# Patient Record
Sex: FEMALE | Race: WHITE | Employment: OTHER | URBAN - METROPOLITAN AREA
[De-identification: names, ages, dates, MRNs, and addresses within clinical notes are randomized per-mention and may not be internally consistent; named-entity substitution may affect disease eponyms.]

---

## 2018-07-19 ENCOUNTER — TELEPHONE (OUTPATIENT)
Dept: NEUROLOGY | Age: 83
End: 2018-07-19

## 2018-07-19 ENCOUNTER — HOSPITAL ENCOUNTER (OUTPATIENT)
Age: 83
Discharge: HOME | End: 2018-07-19
Payer: MEDICARE

## 2018-07-19 ENCOUNTER — INITIAL CONSULT (OUTPATIENT)
Dept: NEUROLOGY | Age: 83
End: 2018-07-19

## 2018-07-19 VITALS
HEART RATE: 60 BPM | OXYGEN SATURATION: 97 % | SYSTOLIC BLOOD PRESSURE: 127 MMHG | WEIGHT: 114 LBS | DIASTOLIC BLOOD PRESSURE: 60 MMHG | BODY MASS INDEX: 20.2 KG/M2 | HEIGHT: 63 IN

## 2018-07-19 DIAGNOSIS — I10 HTN (HYPERTENSION), BENIGN: ICD-10-CM

## 2018-07-19 DIAGNOSIS — G50.0 TRIGEMINAL NEURALGIA: Primary | ICD-10-CM

## 2018-07-19 LAB
A/G RATIO: 1.6 (ref 1.1–2.2)
ALBUMIN SERPL-MCNC: 4.7 G/DL (ref 3.4–5)
ALP BLD-CCNC: 98 U/L (ref 40–129)
ALT SERPL-CCNC: 17 U/L (ref 10–40)
ANION GAP SERPL CALCULATED.3IONS-SCNC: 13 MMOL/L (ref 3–16)
AST SERPL-CCNC: 24 U/L (ref 15–37)
BASOPHILS ABSOLUTE: 0.1 K/UL (ref 0–0.2)
BASOPHILS RELATIVE PERCENT: 0.9 %
BILIRUB SERPL-MCNC: 0.6 MG/DL (ref 0–1)
BILIRUBIN DIRECT: <0.2 MG/DL (ref 0–0.3)
BILIRUBIN, INDIRECT: NORMAL MG/DL (ref 0–1)
BUN BLDV-MCNC: 25 MG/DL (ref 7–20)
CALCIUM SERPL-MCNC: 9.7 MG/DL (ref 8.3–10.6)
CHLORIDE BLD-SCNC: 98 MMOL/L (ref 99–110)
CO2: 25 MMOL/L (ref 21–32)
CREAT SERPL-MCNC: 1.1 MG/DL (ref 0.6–1.2)
EOSINOPHILS ABSOLUTE: 0.2 K/UL (ref 0–0.6)
EOSINOPHILS RELATIVE PERCENT: 2.9 %
FOLATE: 16.65 NG/ML (ref 4.78–24.2)
GFR AFRICAN AMERICAN: 58
GFR NON-AFRICAN AMERICAN: 48
GLOBULIN: 3 G/DL
GLUCOSE BLD-MCNC: 99 MG/DL (ref 70–99)
HCT VFR BLD CALC: 38.5 % (ref 36–48)
HEMOGLOBIN: 13.3 G/DL (ref 12–16)
LYMPHOCYTES ABSOLUTE: 2 K/UL (ref 1–5.1)
LYMPHOCYTES RELATIVE PERCENT: 32 %
MCH RBC QN AUTO: 31 PG (ref 26–34)
MCHC RBC AUTO-ENTMCNC: 34.7 G/DL (ref 31–36)
MCV RBC AUTO: 89.5 FL (ref 80–100)
MONOCYTES ABSOLUTE: 0.7 K/UL (ref 0–1.3)
MONOCYTES RELATIVE PERCENT: 10.7 %
NEUTROPHILS ABSOLUTE: 3.4 K/UL (ref 1.7–7.7)
NEUTROPHILS RELATIVE PERCENT: 53.5 %
PDW BLD-RTO: 12.7 % (ref 12.4–15.4)
PLATELET # BLD: 268 K/UL (ref 135–450)
PMV BLD AUTO: 7.3 FL (ref 5–10.5)
POTASSIUM SERPL-SCNC: 4.6 MMOL/L (ref 3.5–5.1)
RBC # BLD: 4.3 M/UL (ref 4–5.2)
SODIUM BLD-SCNC: 136 MMOL/L (ref 136–145)
TOTAL PROTEIN: 7.7 G/DL (ref 6.4–8.2)
VITAMIN B-12: 513 PG/ML (ref 211–911)
WBC # BLD: 6.3 K/UL (ref 4–11)

## 2018-07-19 PROCEDURE — G8400 PT W/DXA NO RESULTS DOC: HCPCS | Performed by: PSYCHIATRY & NEUROLOGY

## 2018-07-19 PROCEDURE — 1036F TOBACCO NON-USER: CPT | Performed by: PSYCHIATRY & NEUROLOGY

## 2018-07-19 PROCEDURE — 82746 ASSAY OF FOLIC ACID SERUM: CPT

## 2018-07-19 PROCEDURE — 82248 BILIRUBIN DIRECT: CPT

## 2018-07-19 PROCEDURE — 4040F PNEUMOC VAC/ADMIN/RCVD: CPT | Performed by: PSYCHIATRY & NEUROLOGY

## 2018-07-19 PROCEDURE — G8420 CALC BMI NORM PARAMETERS: HCPCS | Performed by: PSYCHIATRY & NEUROLOGY

## 2018-07-19 PROCEDURE — 85025 COMPLETE CBC W/AUTO DIFF WBC: CPT

## 2018-07-19 PROCEDURE — 36415 COLL VENOUS BLD VENIPUNCTURE: CPT

## 2018-07-19 PROCEDURE — 1101F PT FALLS ASSESS-DOCD LE1/YR: CPT | Performed by: PSYCHIATRY & NEUROLOGY

## 2018-07-19 PROCEDURE — 99204 OFFICE O/P NEW MOD 45 MIN: CPT | Performed by: PSYCHIATRY & NEUROLOGY

## 2018-07-19 PROCEDURE — 1090F PRES/ABSN URINE INCON ASSESS: CPT | Performed by: PSYCHIATRY & NEUROLOGY

## 2018-07-19 PROCEDURE — 80053 COMPREHEN METABOLIC PANEL: CPT

## 2018-07-19 PROCEDURE — 82607 VITAMIN B-12: CPT

## 2018-07-19 PROCEDURE — G8427 DOCREV CUR MEDS BY ELIG CLIN: HCPCS | Performed by: PSYCHIATRY & NEUROLOGY

## 2018-07-19 PROCEDURE — 1123F ACP DISCUSS/DSCN MKR DOCD: CPT | Performed by: PSYCHIATRY & NEUROLOGY

## 2018-07-19 RX ORDER — ACETAMINOPHEN 500 MG
500 TABLET ORAL EVERY 6 HOURS PRN
COMMUNITY

## 2018-07-19 RX ORDER — CARBAMAZEPINE 100 MG/1
100 TABLET, EXTENDED RELEASE ORAL 2 TIMES DAILY
Qty: 60 TABLET | Refills: 2 | Status: SHIPPED | OUTPATIENT
Start: 2018-07-19 | End: 2018-08-07 | Stop reason: SINTOL

## 2018-07-19 RX ORDER — TRAMADOL HYDROCHLORIDE 50 MG/1
TABLET ORAL
COMMUNITY
Start: 2018-07-11 | End: 2018-08-13

## 2018-07-19 NOTE — PROGRESS NOTES
The patient is a [de-identified]y.o. years old female who  was referred by LV Benson CNP  for consultation regarding new onset right facial pain. HPI:  The patient describes history of intractable right facial pain that started 2 years ago. She was diagnosed in the past with trigeminal neuralgia and she was placed on gabapentin. She was doing well until a week ago. She started having some severe pain which was daily. She has been seen in the ED recently for such pain. Pain is in the right side of face. Pain can be severe and daily. Pain is electric shooting and throbbing. Duration is seconds to minutes. Pressure and hot packs make it better. Not worse with eating or touch but she can not eat when she is having such pain. No other associated symptoms. No headaches, visual changes, weakness, numbness or recent fever. She tried Ultram with some relief. She is taking Gabapentin 300 mg tid for the last 1-2  Years. She takes Baclofen 10 mg at night as needed. She hasn't had any recent MRI of the brain. She denies any recent fever or chill, or trauma. History of HTN on medication. She denies any CP or headaches. She denies any sleep issues, falling, recent fever and other ROS was negative. Past Medical History:   Diagnosis Date    Cancer [facility])     breast    Hyperlipidemia     Hypertension     Trigeminal neuralgia     Trigeminal neuralgia 7/19/2018     Prior to Visit Medications    Medication Sig Taking? Authorizing Provider   traMADol (ULTRAM) 50 MG tablet  Yes Historical Provider, MD   acetaminophen (TYLENOL) 500 MG tablet Take 500 mg by mouth every 6 hours as needed for Pain Yes Historical Provider, MD   carBAMazepine (TEGRETOL-XR) 100 MG extended release tablet Take 1 tablet by mouth 2 times daily Yes Davida Esparza MD   gabapentin (NEURONTIN) 300 MG capsule Take 300 mg by mouth 3 times daily. . Yes Historical Provider, MD   baclofen (LIORESAL) 10 MG tablet Take 10 mg by mouth every

## 2018-07-19 NOTE — LETTER
Kati Newsome MD    [facility]  [address] [facility], [address] [place] [address]. [address] [place] [address]    [phone] (Phone)  [phone] (Fax)    Dear LV Rodriguez CNP    I had the pleasure of seeing your patient Keisha Patel  1935 today. I have attached a detailed summary below:    The patient is a [de-identified]y.o. years old female who  was referred by LV Mcdonald CNP  for consultation regarding new onset right facial pain. HPI:  The patient describes history of intractable right facial pain that started 2 years ago. She was diagnosed in the past with trigeminal neuralgia and she was placed on gabapentin. She was doing well until a week ago. She started having some severe pain which was daily. She has been seen in the ED recently for such pain. Pain is in the right side of face. Pain can be severe and daily. Pain is electric shooting and throbbing. Duration is seconds to minutes. Pressure and hot packs make it better. Not worse with eating or touch but she can not eat when she is having such pain. No other associated symptoms. No headaches, visual changes, weakness, numbness or recent fever. She tried Ultram with some relief. She is taking Gabapentin 300 mg tid for the last 1-2  Years. She takes Baclofen 10 mg at night as needed. She hasn't had any recent MRI of the brain. She denies any recent fever or chill, or trauma. History of HTN on medication. She denies any CP or headaches. She denies any sleep issues, falling, recent fever and other ROS was negative. Past Medical History:   Diagnosis Date    Cancer [facility])     breast    Hyperlipidemia     Hypertension     Trigeminal neuralgia     Trigeminal neuralgia 2018     Prior to Visit Medications    Medication Sig Taking?  Authorizing Provider   traMADol (ULTRAM) 50 MG tablet  Yes Historical Provider, MD acetaminophen (TYLENOL) 500 MG tablet Take 500 mg by mouth every 6 hours as needed for Pain Yes Historical Provider, MD   carBAMazepine (TEGRETOL-XR) 100 MG extended release tablet Take 1 tablet by mouth 2 times daily Yes Jennifer Scott MD   gabapentin (NEURONTIN) 300 MG capsule Take 300 mg by mouth 3 times daily. . Yes Historical Provider, MD   baclofen (LIORESAL) 10 MG tablet Take 10 mg by mouth every evening Yes Historical Provider, MD   vitamin D (ERGOCALCIFEROL) 69206 UNITS CAPS capsule Take 1 capsule by mouth twice a week. Yes Corrinne Finders, MD   atenolol (TENORMIN) 50 MG tablet Take 50 mg by mouth daily. Yes Historical Provider, MD   lisinopril-hydrochlorothiazide (PRINZIDE;ZESTORETIC) 20-12.5 MG per tablet Take 1 tablet by mouth daily. Yes Historical Provider, MD     Allergies   Allergen Reactions    Erythromycin      Social History   Substance Use Topics    Smoking status: Never Smoker    Smokeless tobacco: Never Used    Alcohol use No     History reviewed. No pertinent family history. History reviewed. No pertinent surgical history. ROS : A 10-12 system review of constitutional, cardiovascular, respiratory, musculoskeletal, endocrine, skin, SHEENT, genitourinary, psychiatric and neurologic systems was obtained and updated today and is unremarkable except as mentioned in my HPI        Exam:   Constitutional:   Vitals:    07/19/18 0957   BP: 127/60   Pulse: 60   SpO2: 97%   Weight: 114 lb (51.7 kg)   Height: 5' 3\" (1.6 m)       General appearance: well-nourished. Eye: No icterus. No blurring of optic disc. Neck: supple  Cardiovascular: No carotid bruit. No lower leg edema with good pulsation. Mental Status: Oriented to person, place, problem, and time. Fluent speech. Good fund of knowledge. Normal attention span and concentration.    Cranial Nerves:   II: Visual fields: Full to confrontation  III: Pupils: equal, round, reactive to light III,IV,VI: Extra Ocular Movements are intact. No nystagmus  V: Facial sensation is intact to pin prick and light touch  VII: Facial strength and movements: intact and symmetric smile,cheek puffing and eyebrow elevation  VIII: Hearing: Intact to finger rub bilaterally  IX: Palate elevation is symmetric  XI: Shoulder shrug is intact  XII: Tongue movements are normal  Musculoskeletal: 5/5 in all 4 extremities. Normal tone. Reflexes: Bilateral biceps 2/4, triceps 2/4, brachial radialis 2/4, knee 2/4 and ankle 2/4. Planters: flexor bilaterally. Coordination: no pronator drift, no dysmetria. Finger nose finger testing within normal limits. Sensation: normal to all modalities. Gait/Posture: steady      Medical decision making:  I personally reviewed and updated social history, past medical history, medications, allergy, surgical history, and family history as documented in the patient's electronic health records. Labs and/or neuroimaging and other test results reviewed and discussed with the patient. Reviewed notes from other physicians. Provided patient education regarding risk, benefits and treatment options as well as adherence to medication regimen and side effect from these medications. Diagnosis Orders   1. Trigeminal neuralgia  MRI Brain WO Contrast    carBAMazepine (TEGRETOL-XR) 100 MG extended release tablet    CBC with Differential    Comprehensive Metabolic Panel    Hepatic Function Panel    Vitamin B12 & Folate   2. HTN (hypertension), benign         Assessment:  Intractable right facial pain consistent with intractable trigeminal neuralgia. Worse recently.    Hypertension      Plan:  Start Tegretol 100 mg XR daily for 5 days then twice daily  Long discussion with the patient regarding side effects from Tegretol and possible skin rash  Check CBC, CMP and LFT  Check B12 and folate  Recheck A1c with PCP  Continue gabapentin 300 mg 3 times daily Advised the patient to avoid using Ultram for breakthrough. We discussed outcome from such a disease, etiology and possible medical and surgical management. Schedule MRI of the brain  Falling precautions  Continue home blood pressure medications  Monitor blood pressure at home  Use baclofen only as needed  Follow-up next month for further recommendation. Please do not hesitate to contact me, should you have any questions or concerns regarding the care of Ros     Sincerely,    Jennifer Lund MD    This dictation was generated by voice recognition computer software. Although all attempts are made to edit the dictation for accuracy, there may be errors in the transcription that are not intended.

## 2018-07-25 ENCOUNTER — TELEPHONE (OUTPATIENT)
Dept: NEUROLOGY | Age: 83
End: 2018-07-25

## 2018-07-27 NOTE — TELEPHONE ENCOUNTER
Patient calls stating she has been taking the Tegretol 100 mg at night (x 2 days), instead of in the morning because it was causing dizziness and nausea, but now she is having terrible crying spells and she was unable to fall asleep last night until after 2 am and woke up at 7 am.    I advised patient to DC the Tegretol and I would leave a message for TS. Patient verbalized understanding. Please advise. Last seen 07.19.18    Assessment:  Intractable right facial pain consistent with intractable trigeminal neuralgia. Worse recently. Hypertension      Plan:  Start Tegretol 100 mg XR daily for 5 days then twice daily  Long discussion with the patient regarding side effects from Tegretol and possible skin rash  Check CBC, CMP and LFT  Check B12 and folate  Recheck A1c with PCP  Continue gabapentin 300 mg 3 times daily  Advised the patient to avoid using Ultram for breakthrough. We discussed outcome from such a disease, etiology and possible medical and surgical management.   Schedule MRI of the brain  Falling precautions  Continue home blood pressure medications  Monitor blood pressure at home  Use baclofen only as needed  Follow-up next month for further recommendation.

## 2018-08-01 ENCOUNTER — HOSPITAL ENCOUNTER (OUTPATIENT)
Dept: MRI IMAGING | Age: 83
Discharge: HOME | End: 2018-08-01
Payer: MEDICARE

## 2018-08-01 DIAGNOSIS — G50.0 TRIGEMINAL NEURALGIA: ICD-10-CM

## 2018-08-01 PROCEDURE — 70551 MRI BRAIN STEM W/O DYE: CPT

## 2018-08-07 NOTE — TELEPHONE ENCOUNTER
Spoke with patient, she is completely off the Tegretol and all side effects have completely resolved. She is back to her baseline. She does not want to try any different medication at this time. She will continue with her  mg TID, and discuss with TS at f/u on Monday.

## 2018-08-13 ENCOUNTER — OFFICE VISIT (OUTPATIENT)
Dept: NEUROLOGY | Age: 83
End: 2018-08-13

## 2018-08-13 VITALS
HEART RATE: 58 BPM | SYSTOLIC BLOOD PRESSURE: 124 MMHG | OXYGEN SATURATION: 97 % | HEIGHT: 63 IN | WEIGHT: 114 LBS | BODY MASS INDEX: 20.2 KG/M2 | DIASTOLIC BLOOD PRESSURE: 58 MMHG

## 2018-08-13 DIAGNOSIS — G50.0 TRIGEMINAL NEURALGIA: Primary | ICD-10-CM

## 2018-08-13 DIAGNOSIS — I10 HTN (HYPERTENSION), BENIGN: ICD-10-CM

## 2018-08-13 DIAGNOSIS — R22.1 NECK MASS: ICD-10-CM

## 2018-08-13 PROCEDURE — 1090F PRES/ABSN URINE INCON ASSESS: CPT | Performed by: PSYCHIATRY & NEUROLOGY

## 2018-08-13 PROCEDURE — 99213 OFFICE O/P EST LOW 20 MIN: CPT | Performed by: PSYCHIATRY & NEUROLOGY

## 2018-08-13 PROCEDURE — 1101F PT FALLS ASSESS-DOCD LE1/YR: CPT | Performed by: PSYCHIATRY & NEUROLOGY

## 2018-08-13 PROCEDURE — G8427 DOCREV CUR MEDS BY ELIG CLIN: HCPCS | Performed by: PSYCHIATRY & NEUROLOGY

## 2018-08-13 PROCEDURE — G8420 CALC BMI NORM PARAMETERS: HCPCS | Performed by: PSYCHIATRY & NEUROLOGY

## 2018-08-13 PROCEDURE — 1123F ACP DISCUSS/DSCN MKR DOCD: CPT | Performed by: PSYCHIATRY & NEUROLOGY

## 2018-08-13 PROCEDURE — 4040F PNEUMOC VAC/ADMIN/RCVD: CPT | Performed by: PSYCHIATRY & NEUROLOGY

## 2018-08-13 PROCEDURE — G8400 PT W/DXA NO RESULTS DOC: HCPCS | Performed by: PSYCHIATRY & NEUROLOGY

## 2018-08-13 PROCEDURE — 1036F TOBACCO NON-USER: CPT | Performed by: PSYCHIATRY & NEUROLOGY

## 2018-08-13 RX ORDER — BACLOFEN 10 MG/1
10 TABLET ORAL 2 TIMES DAILY PRN
Qty: 20 TABLET | Refills: 0 | Status: SHIPPED | OUTPATIENT
Start: 2018-08-13 | End: 2019-02-12

## 2018-08-13 NOTE — LETTER
Kassy Alva MD    [facility]  [address] [facility], [address] [place] [address]. [address] [place] [address]    [phone] (Phone)  [phone] (Fax)    Dear Dr Alana Bush, APRN - CNP    I had the pleasure of seeing your patient Lacy Ellis  1935 today. I have attached a detailed summary below:    The patient came today for follow up regarding: facial pain    Since the patient's last visit, she had her MRI of the brain which showed no intracranial lesions but showed incidental left nasopharyngeal lesion. She had a blood test which was unremarkable. The patient was started on low-dose Tegretol however she developed side effects with sedation, lightheadedness and significant insomnia. She decided to discontinue such medication. She is now on gabapentin 300 mg 3 times daily. She denies any side effect of such medication. She continues to have spells of right facial pain. She described less severe attacks since her last visit. Spells could last for few seconds to minutes. Degree is moderate to severe and description is electric shooting pain. No other associated symptoms. No headache, double vision or blurred vision or numbness or tingling. No neck or back pain. Blood pressure is controlled on medications. Other review of system was remarkable for increased coughing over the last few weeks. Past Medical History:   Diagnosis Date    Cancer [facility])     breast    Hyperlipidemia     Hypertension     Trigeminal neuralgia     Trigeminal neuralgia 2018     Prior to Visit Medications    Medication Sig Taking? Authorizing Provider   baclofen (LIORESAL) 10 MG tablet Take 1 tablet by mouth 2 times daily as needed (pain) Yes Ana Degroot MD   acetaminophen (TYLENOL) 500 MG tablet Take 500 mg by mouth every 6 hours as needed for Pain Yes Historical Provider, MD   gabapentin (NEURONTIN) 300 MG capsule Take 300 mg by mouth 3 times daily. Sheela Damian

## 2018-08-13 NOTE — PROGRESS NOTES
The patient came today for follow up regarding: facial pain    Since the patient's last visit, she had her MRI of the brain which showed no intracranial lesions but showed incidental left nasopharyngeal lesion. She had a blood test which was unremarkable. The patient was started on low-dose Tegretol however she developed side effects with sedation, lightheadedness and significant insomnia. She decided to discontinue such medication. She is now on gabapentin 300 mg 3 times daily. She denies any side effect of such medication. She continues to have spells of right facial pain. She described less severe attacks since her last visit. Spells could last for few seconds to minutes. Degree is moderate to severe and description is electric shooting pain. No other associated symptoms. No headache, double vision or blurred vision or numbness or tingling. No neck or back pain. Blood pressure is controlled on medications. Other review of system was remarkable for increased coughing over the last few weeks. Past Medical History:   Diagnosis Date    Cancer [facility])     breast    Hyperlipidemia     Hypertension     Trigeminal neuralgia     Trigeminal neuralgia 7/19/2018     Prior to Visit Medications    Medication Sig Taking? Authorizing Provider   baclofen (LIORESAL) 10 MG tablet Take 1 tablet by mouth 2 times daily as needed (pain) Yes Hilary Sharif MD   acetaminophen (TYLENOL) 500 MG tablet Take 500 mg by mouth every 6 hours as needed for Pain Yes Historical Provider, MD   gabapentin (NEURONTIN) 300 MG capsule Take 300 mg by mouth 3 times daily. . Yes Historical Provider, MD   vitamin D (ERGOCALCIFEROL) 25840 UNITS CAPS capsule Take 1 capsule by mouth twice a week. Yes Fani Odonnell MD   atenolol (TENORMIN) 50 MG tablet Take 50 mg by mouth daily. Yes Historical Provider, MD   lisinopril-hydrochlorothiazide (PRINZIDE;ZESTORETIC) 20-12.5 MG per tablet Take 1 tablet by mouth daily.    Yes Historical Provider, MD     Allergies   Allergen Reactions    Erythromycin      Social History   Substance Use Topics    Smoking status: Never Smoker    Smokeless tobacco: Never Used    Alcohol use No     History reviewed. No pertinent family history. History reviewed. No pertinent surgical history. Exam:   Constitutional:   Vitals:    08/13/18 1155   BP: (!) 124/58   Pulse: 58   SpO2: 97%   Weight: 114 lb (51.7 kg)   Height: 5' 3\" (1.6 m)       General appearance: well-nourished. Eye: No icterus. No blurring of optic disc. Neck: supple  Cardiovascular: No carotid bruit. Heart: S1, S2         No lower leg edema with good pulsation. Mental Status: Oriented to person, place, problem, and time. Fluent speech. Good fund of knowledge. Normal attention span and concentration. Cranial Nerves:   II: Visual fields: Full to confrontation  III: Pupils: equal, round, reactive to light  III,IV,VI: Extra Ocular Movements are intact. No nystagmus  V: Facial sensation is intact to pin prick and light touch  VII: Facial strength and movements: intact and symmetric smile,cheek puffing and eyebrow elevation  VIII: Hearing: Intact to finger rub bilaterally  IX: Palate elevation is symmetric  XI: Shoulder shrug is intact  XII: Tongue movements are normal  Musculoskeletal: 5/5 in all 4 extremities. Normal tone. Reflexes: Bilateral biceps 2/4, triceps 2/4, brachial radialis 2/4, knee 2/4 and ankle 2/4. Planters: flexor bilaterally. Coordination: no pronator drift, no dysmetria. Finger nose finger testing within normal limits. Sensation: normal to all modalities.   Gait/Posture: steady    ROS : A 10-12 system review of constitutional, cardiovascular, respiratory, musculoskeletal, endocrine, hematological, skin, SHEENT, genitourinary, psychiatric and neurologic systems was obtained and updated today which is unremarkable except as mentioned in my HPI      Medical decision making:  I personally reviewed and

## 2019-02-01 ENCOUNTER — APPOINTMENT (OUTPATIENT)
Dept: CT IMAGING | Age: 84
DRG: 444 | End: 2019-02-01
Payer: MEDICARE

## 2019-02-01 ENCOUNTER — HOSPITAL ENCOUNTER (INPATIENT)
Age: 84
LOS: 5 days | Discharge: HOME | DRG: 444 | End: 2019-02-06
Attending: EMERGENCY MEDICINE | Admitting: INTERNAL MEDICINE
Payer: MEDICARE

## 2019-02-01 DIAGNOSIS — K85.90 ACUTE PANCREATITIS, UNSPECIFIED COMPLICATION STATUS, UNSPECIFIED PANCREATITIS TYPE: Primary | ICD-10-CM

## 2019-02-01 LAB
A/G RATIO: 1.4 (ref 1.1–2.2)
ALBUMIN SERPL-MCNC: 4.6 G/DL (ref 3.4–5)
ALP BLD-CCNC: 109 U/L (ref 40–129)
ALT SERPL-CCNC: 113 U/L (ref 10–40)
ANION GAP SERPL CALCULATED.3IONS-SCNC: 15 MMOL/L (ref 3–16)
AST SERPL-CCNC: 226 U/L (ref 15–37)
BACTERIA: ABNORMAL /HPF
BASOPHILS ABSOLUTE: 0.1 K/UL (ref 0–0.2)
BASOPHILS RELATIVE PERCENT: 0.4 %
BILIRUB SERPL-MCNC: 0.8 MG/DL (ref 0–1)
BILIRUBIN URINE: NEGATIVE
BLOOD, URINE: NEGATIVE
BUN BLDV-MCNC: 18 MG/DL (ref 7–20)
CALCIUM SERPL-MCNC: 9.8 MG/DL (ref 8.3–10.6)
CHLORIDE BLD-SCNC: 96 MMOL/L (ref 99–110)
CLARITY: CLEAR
CO2: 27 MMOL/L (ref 21–32)
COLOR: YELLOW
CREAT SERPL-MCNC: 0.9 MG/DL (ref 0.6–1.2)
EOSINOPHILS ABSOLUTE: 0 K/UL (ref 0–0.6)
EOSINOPHILS RELATIVE PERCENT: 0.3 %
EPITHELIAL CELLS, UA: ABNORMAL /HPF
GFR AFRICAN AMERICAN: >60
GFR NON-AFRICAN AMERICAN: >60
GLOBULIN: 3.3 G/DL
GLUCOSE BLD-MCNC: 140 MG/DL (ref 70–99)
GLUCOSE BLD-MCNC: 172 MG/DL (ref 70–99)
GLUCOSE URINE: NEGATIVE MG/DL
HCT VFR BLD CALC: 41.1 % (ref 36–48)
HEMOGLOBIN: 13.8 G/DL (ref 12–16)
KETONES, URINE: NEGATIVE MG/DL
LEUKOCYTE ESTERASE, URINE: NEGATIVE
LIPASE: >600 U/L (ref 13–60)
LYMPHOCYTES ABSOLUTE: 0.9 K/UL (ref 1–5.1)
LYMPHOCYTES RELATIVE PERCENT: 5.8 %
MCH RBC QN AUTO: 30.6 PG (ref 26–34)
MCHC RBC AUTO-ENTMCNC: 33.5 G/DL (ref 31–36)
MCV RBC AUTO: 91.4 FL (ref 80–100)
MICROSCOPIC EXAMINATION: YES
MONOCYTES ABSOLUTE: 1.1 K/UL (ref 0–1.3)
MONOCYTES RELATIVE PERCENT: 7 %
NEUTROPHILS ABSOLUTE: 13.7 K/UL (ref 1.7–7.7)
NEUTROPHILS RELATIVE PERCENT: 86.5 %
NITRITE, URINE: NEGATIVE
PDW BLD-RTO: 12.7 % (ref 12.4–15.4)
PERFORMED ON: ABNORMAL
PH UA: 7
PLATELET # BLD: 311 K/UL (ref 135–450)
PMV BLD AUTO: 6.9 FL (ref 5–10.5)
POTASSIUM SERPL-SCNC: 4.1 MMOL/L (ref 3.5–5.1)
PROTEIN UA: 30 MG/DL
RBC # BLD: 4.49 M/UL (ref 4–5.2)
RBC UA: ABNORMAL /HPF (ref 0–2)
SODIUM BLD-SCNC: 138 MMOL/L (ref 136–145)
SPECIFIC GRAVITY UA: 1.01
TOTAL PROTEIN: 7.9 G/DL (ref 6.4–8.2)
URINE REFLEX TO CULTURE: ABNORMAL
URINE TYPE: ABNORMAL
UROBILINOGEN, URINE: 1 E.U./DL
WBC # BLD: 15.8 K/UL (ref 4–11)
WBC UA: ABNORMAL /HPF (ref 0–5)

## 2019-02-01 PROCEDURE — 6360000002 HC RX W HCPCS: Performed by: EMERGENCY MEDICINE

## 2019-02-01 PROCEDURE — 1200000000 HC SEMI PRIVATE

## 2019-02-01 PROCEDURE — 96374 THER/PROPH/DIAG INJ IV PUSH: CPT

## 2019-02-01 PROCEDURE — 81001 URINALYSIS AUTO W/SCOPE: CPT

## 2019-02-01 PROCEDURE — 85025 COMPLETE CBC W/AUTO DIFF WBC: CPT

## 2019-02-01 PROCEDURE — 80053 COMPREHEN METABOLIC PANEL: CPT

## 2019-02-01 PROCEDURE — 83690 ASSAY OF LIPASE: CPT

## 2019-02-01 PROCEDURE — 6360000004 HC RX CONTRAST MEDICATION: Performed by: EMERGENCY MEDICINE

## 2019-02-01 PROCEDURE — 2580000003 HC RX 258: Performed by: EMERGENCY MEDICINE

## 2019-02-01 PROCEDURE — 99285 EMERGENCY DEPT VISIT HI MDM: CPT

## 2019-02-01 PROCEDURE — 6360000002 HC RX W HCPCS: Performed by: NURSE PRACTITIONER

## 2019-02-01 PROCEDURE — 74177 CT ABD & PELVIS W/CONTRAST: CPT

## 2019-02-01 RX ORDER — SODIUM CHLORIDE 9 MG/ML
INJECTION, SOLUTION INTRAVENOUS CONTINUOUS
Status: DISCONTINUED | OUTPATIENT
Start: 2019-02-01 | End: 2019-02-06

## 2019-02-01 RX ORDER — MORPHINE SULFATE 2 MG/ML
2 INJECTION, SOLUTION INTRAMUSCULAR; INTRAVENOUS
Status: DISCONTINUED | OUTPATIENT
Start: 2019-02-01 | End: 2019-02-06 | Stop reason: HOSPADM

## 2019-02-01 RX ORDER — DEXTROSE MONOHYDRATE 25 G/50ML
12.5 INJECTION, SOLUTION INTRAVENOUS PRN
Status: DISCONTINUED | OUTPATIENT
Start: 2019-02-01 | End: 2019-02-06 | Stop reason: HOSPADM

## 2019-02-01 RX ORDER — DEXTROSE MONOHYDRATE 50 MG/ML
100 INJECTION, SOLUTION INTRAVENOUS PRN
Status: DISCONTINUED | OUTPATIENT
Start: 2019-02-01 | End: 2019-02-06 | Stop reason: HOSPADM

## 2019-02-01 RX ORDER — NICOTINE POLACRILEX 4 MG
15 LOZENGE BUCCAL PRN
Status: DISCONTINUED | OUTPATIENT
Start: 2019-02-01 | End: 2019-02-06 | Stop reason: HOSPADM

## 2019-02-01 RX ORDER — ATENOLOL 25 MG/1
50 TABLET ORAL DAILY
Status: DISCONTINUED | OUTPATIENT
Start: 2019-02-02 | End: 2019-02-06 | Stop reason: HOSPADM

## 2019-02-01 RX ORDER — ACETAMINOPHEN 325 MG/1
650 TABLET ORAL EVERY 4 HOURS PRN
Status: DISCONTINUED | OUTPATIENT
Start: 2019-02-01 | End: 2019-02-06 | Stop reason: HOSPADM

## 2019-02-01 RX ORDER — ERGOCALCIFEROL 1.25 MG/1
50000 CAPSULE ORAL
Status: DISCONTINUED | OUTPATIENT
Start: 2019-02-04 | End: 2019-02-06 | Stop reason: HOSPADM

## 2019-02-01 RX ORDER — SODIUM CHLORIDE 9 MG/ML
1000 INJECTION, SOLUTION INTRAVENOUS ONCE
Status: COMPLETED | OUTPATIENT
Start: 2019-02-01 | End: 2019-02-01

## 2019-02-01 RX ORDER — ONDANSETRON 2 MG/ML
4 INJECTION INTRAMUSCULAR; INTRAVENOUS EVERY 6 HOURS PRN
Status: DISCONTINUED | OUTPATIENT
Start: 2019-02-01 | End: 2019-02-06 | Stop reason: HOSPADM

## 2019-02-01 RX ORDER — HYDROCHLOROTHIAZIDE 25 MG/1
12.5 TABLET ORAL DAILY
Status: DISCONTINUED | OUTPATIENT
Start: 2019-02-02 | End: 2019-02-02

## 2019-02-01 RX ORDER — SODIUM CHLORIDE 0.9 % (FLUSH) 0.9 %
10 SYRINGE (ML) INJECTION EVERY 12 HOURS SCHEDULED
Status: DISCONTINUED | OUTPATIENT
Start: 2019-02-01 | End: 2019-02-06 | Stop reason: HOSPADM

## 2019-02-01 RX ORDER — GABAPENTIN 300 MG/1
300 CAPSULE ORAL 3 TIMES DAILY
Status: DISCONTINUED | OUTPATIENT
Start: 2019-02-01 | End: 2019-02-01

## 2019-02-01 RX ORDER — MORPHINE SULFATE 4 MG/ML
4 INJECTION, SOLUTION INTRAMUSCULAR; INTRAVENOUS
Status: DISCONTINUED | OUTPATIENT
Start: 2019-02-01 | End: 2019-02-06 | Stop reason: HOSPADM

## 2019-02-01 RX ORDER — SODIUM CHLORIDE 0.9 % (FLUSH) 0.9 %
10 SYRINGE (ML) INJECTION PRN
Status: DISCONTINUED | OUTPATIENT
Start: 2019-02-01 | End: 2019-02-06 | Stop reason: HOSPADM

## 2019-02-01 RX ORDER — MORPHINE SULFATE 2 MG/ML
2 INJECTION, SOLUTION INTRAMUSCULAR; INTRAVENOUS ONCE
Status: COMPLETED | OUTPATIENT
Start: 2019-02-01 | End: 2019-02-01

## 2019-02-01 RX ORDER — GABAPENTIN 100 MG/1
200 CAPSULE ORAL 3 TIMES DAILY
Status: DISCONTINUED | OUTPATIENT
Start: 2019-02-02 | End: 2019-02-02

## 2019-02-01 RX ORDER — LISINOPRIL 20 MG/1
20 TABLET ORAL DAILY
Status: DISCONTINUED | OUTPATIENT
Start: 2019-02-02 | End: 2019-02-02

## 2019-02-01 RX ORDER — LISINOPRIL AND HYDROCHLOROTHIAZIDE 20; 12.5 MG/1; MG/1
1 TABLET ORAL DAILY
Status: DISCONTINUED | OUTPATIENT
Start: 2019-02-02 | End: 2019-02-01

## 2019-02-01 RX ADMIN — MORPHINE SULFATE 2 MG: 2 INJECTION, SOLUTION INTRAMUSCULAR; INTRAVENOUS at 20:11

## 2019-02-01 RX ADMIN — SODIUM CHLORIDE 1000 ML: 9 INJECTION, SOLUTION INTRAVENOUS at 21:13

## 2019-02-01 RX ADMIN — MORPHINE SULFATE 2 MG: 2 INJECTION, SOLUTION INTRAMUSCULAR; INTRAVENOUS at 23:49

## 2019-02-01 RX ADMIN — IOPAMIDOL 75 ML: 755 INJECTION, SOLUTION INTRAVENOUS at 19:57

## 2019-02-01 ASSESSMENT — ENCOUNTER SYMPTOMS
NAUSEA: 1
RHINORRHEA: 0
SORE THROAT: 0
CHEST TIGHTNESS: 0
TROUBLE SWALLOWING: 0
STRIDOR: 0
WHEEZING: 0
SHORTNESS OF BREATH: 0
ABDOMINAL PAIN: 1
COUGH: 0
VOMITING: 1
DIARRHEA: 1

## 2019-02-01 ASSESSMENT — PAIN DESCRIPTION - LOCATION
LOCATION: ABDOMEN

## 2019-02-01 ASSESSMENT — PAIN DESCRIPTION - PAIN TYPE
TYPE: ACUTE PAIN

## 2019-02-01 ASSESSMENT — PAIN SCALES - GENERAL
PAINLEVEL_OUTOF10: 10
PAINLEVEL_OUTOF10: 3
PAINLEVEL_OUTOF10: 4
PAINLEVEL_OUTOF10: 4
PAINLEVEL_OUTOF10: 5

## 2019-02-02 ENCOUNTER — APPOINTMENT (OUTPATIENT)
Dept: MRI IMAGING | Age: 84
DRG: 444 | End: 2019-02-02
Payer: MEDICARE

## 2019-02-02 PROBLEM — R79.89 ABNORMAL LIVER FUNCTION TESTS: Status: ACTIVE | Noted: 2019-02-02

## 2019-02-02 PROBLEM — K81.9 CHOLECYSTITIS: Status: ACTIVE | Noted: 2019-02-02

## 2019-02-02 PROBLEM — N94.9 ADNEXAL CYST: Status: ACTIVE | Noted: 2019-02-02

## 2019-02-02 LAB
ALBUMIN SERPL-MCNC: 3.6 G/DL (ref 3.4–5)
ALP BLD-CCNC: 81 U/L (ref 40–129)
ALT SERPL-CCNC: 69 U/L (ref 10–40)
ANION GAP SERPL CALCULATED.3IONS-SCNC: 10 MMOL/L (ref 3–16)
AST SERPL-CCNC: 69 U/L (ref 15–37)
BASOPHILS ABSOLUTE: 0 K/UL (ref 0–0.2)
BASOPHILS RELATIVE PERCENT: 0.4 %
BILIRUB SERPL-MCNC: 0.9 MG/DL (ref 0–1)
BILIRUBIN DIRECT: <0.2 MG/DL (ref 0–0.3)
BILIRUBIN, INDIRECT: ABNORMAL MG/DL (ref 0–1)
BUN BLDV-MCNC: 17 MG/DL (ref 7–20)
CALCIUM SERPL-MCNC: 8.5 MG/DL (ref 8.3–10.6)
CHLORIDE BLD-SCNC: 105 MMOL/L (ref 99–110)
CHOLESTEROL, TOTAL: 190 MG/DL (ref 0–199)
CO2: 24 MMOL/L (ref 21–32)
CREAT SERPL-MCNC: 0.8 MG/DL (ref 0.6–1.2)
EOSINOPHILS ABSOLUTE: 0.1 K/UL (ref 0–0.6)
EOSINOPHILS RELATIVE PERCENT: 0.8 %
GFR AFRICAN AMERICAN: >60
GFR NON-AFRICAN AMERICAN: >60
GLUCOSE BLD-MCNC: 121 MG/DL (ref 70–99)
GLUCOSE BLD-MCNC: 121 MG/DL (ref 70–99)
GLUCOSE BLD-MCNC: 125 MG/DL (ref 70–99)
GLUCOSE BLD-MCNC: 155 MG/DL (ref 70–99)
GLUCOSE BLD-MCNC: 188 MG/DL (ref 70–99)
HCT VFR BLD CALC: 37.1 % (ref 36–48)
HDLC SERPL-MCNC: 34 MG/DL (ref 40–60)
HEMOGLOBIN: 12.3 G/DL (ref 12–16)
LDL CHOLESTEROL CALCULATED: 129 MG/DL
LIPASE: >600 U/L (ref 13–60)
LYMPHOCYTES ABSOLUTE: 2.4 K/UL (ref 1–5.1)
LYMPHOCYTES RELATIVE PERCENT: 24.1 %
MCH RBC QN AUTO: 30.8 PG (ref 26–34)
MCHC RBC AUTO-ENTMCNC: 33.3 G/DL (ref 31–36)
MCV RBC AUTO: 92.5 FL (ref 80–100)
MONOCYTES ABSOLUTE: 0.9 K/UL (ref 0–1.3)
MONOCYTES RELATIVE PERCENT: 9.7 %
NEUTROPHILS ABSOLUTE: 6.3 K/UL (ref 1.7–7.7)
NEUTROPHILS RELATIVE PERCENT: 65 %
PDW BLD-RTO: 13.2 % (ref 12.4–15.4)
PERFORMED ON: ABNORMAL
PLATELET # BLD: 257 K/UL (ref 135–450)
PMV BLD AUTO: 7 FL (ref 5–10.5)
POTASSIUM REFLEX MAGNESIUM: 3.7 MMOL/L (ref 3.5–5.1)
RBC # BLD: 4.01 M/UL (ref 4–5.2)
SODIUM BLD-SCNC: 139 MMOL/L (ref 136–145)
TOTAL PROTEIN: 6.2 G/DL (ref 6.4–8.2)
TRIGL SERPL-MCNC: 135 MG/DL (ref 0–150)
VLDLC SERPL CALC-MCNC: 27 MG/DL
WBC # BLD: 9.8 K/UL (ref 4–11)

## 2019-02-02 PROCEDURE — 6360000002 HC RX W HCPCS: Performed by: NURSE PRACTITIONER

## 2019-02-02 PROCEDURE — 85025 COMPLETE CBC W/AUTO DIFF WBC: CPT

## 2019-02-02 PROCEDURE — 2580000003 HC RX 258: Performed by: HOSPITALIST

## 2019-02-02 PROCEDURE — 2580000003 HC RX 258: Performed by: NURSE PRACTITIONER

## 2019-02-02 PROCEDURE — 36415 COLL VENOUS BLD VENIPUNCTURE: CPT

## 2019-02-02 PROCEDURE — 74181 MRI ABDOMEN W/O CONTRAST: CPT

## 2019-02-02 PROCEDURE — 80076 HEPATIC FUNCTION PANEL: CPT

## 2019-02-02 PROCEDURE — 1200000000 HC SEMI PRIVATE

## 2019-02-02 PROCEDURE — 80061 LIPID PANEL: CPT

## 2019-02-02 PROCEDURE — 80048 BASIC METABOLIC PNL TOTAL CA: CPT

## 2019-02-02 PROCEDURE — 6370000000 HC RX 637 (ALT 250 FOR IP): Performed by: NURSE PRACTITIONER

## 2019-02-02 PROCEDURE — 6360000002 HC RX W HCPCS: Performed by: HOSPITALIST

## 2019-02-02 PROCEDURE — 6370000000 HC RX 637 (ALT 250 FOR IP): Performed by: HOSPITALIST

## 2019-02-02 PROCEDURE — 83690 ASSAY OF LIPASE: CPT

## 2019-02-02 RX ORDER — GABAPENTIN 100 MG/1
200 CAPSULE ORAL 2 TIMES DAILY
Status: DISCONTINUED | OUTPATIENT
Start: 2019-02-02 | End: 2019-02-06 | Stop reason: HOSPADM

## 2019-02-02 RX ORDER — LOSARTAN POTASSIUM 25 MG/1
25 TABLET ORAL DAILY
Status: DISCONTINUED | OUTPATIENT
Start: 2019-02-03 | End: 2019-02-06 | Stop reason: HOSPADM

## 2019-02-02 RX ADMIN — SODIUM CHLORIDE: 9 INJECTION, SOLUTION INTRAVENOUS at 00:36

## 2019-02-02 RX ADMIN — ATENOLOL 50 MG: 25 TABLET ORAL at 10:08

## 2019-02-02 RX ADMIN — GABAPENTIN 200 MG: 100 CAPSULE ORAL at 10:08

## 2019-02-02 RX ADMIN — PIPERACILLIN AND TAZOBACTAM 3.38 G: 3; .375 INJECTION, POWDER, FOR SOLUTION INTRAVENOUS at 22:10

## 2019-02-02 RX ADMIN — HYDROCHLOROTHIAZIDE 12.5 MG: 25 TABLET ORAL at 10:09

## 2019-02-02 RX ADMIN — SODIUM CHLORIDE: 9 INJECTION, SOLUTION INTRAVENOUS at 18:30

## 2019-02-02 RX ADMIN — INSULIN LISPRO 1 UNITS: 100 INJECTION, SOLUTION INTRAVENOUS; SUBCUTANEOUS at 20:42

## 2019-02-02 RX ADMIN — GABAPENTIN 200 MG: 100 CAPSULE ORAL at 20:42

## 2019-02-02 RX ADMIN — ENOXAPARIN SODIUM 40 MG: 40 INJECTION SUBCUTANEOUS at 13:20

## 2019-02-02 RX ADMIN — INSULIN LISPRO 1 UNITS: 100 INJECTION, SOLUTION INTRAVENOUS; SUBCUTANEOUS at 18:25

## 2019-02-02 RX ADMIN — PIPERACILLIN AND TAZOBACTAM 3.38 G: 3; .375 INJECTION, POWDER, FOR SOLUTION INTRAVENOUS at 15:49

## 2019-02-02 RX ADMIN — LISINOPRIL 20 MG: 20 TABLET ORAL at 10:09

## 2019-02-02 RX ADMIN — ENOXAPARIN SODIUM 40 MG: 40 INJECTION SUBCUTANEOUS at 13:22

## 2019-02-02 ASSESSMENT — PAIN SCALES - GENERAL
PAINLEVEL_OUTOF10: 0

## 2019-02-02 ASSESSMENT — PAIN DESCRIPTION - PAIN TYPE: TYPE: ACUTE PAIN

## 2019-02-02 ASSESSMENT — PAIN DESCRIPTION - LOCATION: LOCATION: ABDOMEN

## 2019-02-03 PROBLEM — K85.10 ACUTE GALLSTONE PANCREATITIS: Status: ACTIVE | Noted: 2019-02-03

## 2019-02-03 LAB
ALBUMIN SERPL-MCNC: 3.4 G/DL (ref 3.4–5)
ALP BLD-CCNC: 78 U/L (ref 40–129)
ALT SERPL-CCNC: 42 U/L (ref 10–40)
ANION GAP SERPL CALCULATED.3IONS-SCNC: 9 MMOL/L (ref 3–16)
AST SERPL-CCNC: 36 U/L (ref 15–37)
BASOPHILS ABSOLUTE: 0 K/UL (ref 0–0.2)
BASOPHILS RELATIVE PERCENT: 0.5 %
BILIRUB SERPL-MCNC: 1.3 MG/DL (ref 0–1)
BILIRUBIN DIRECT: 0.3 MG/DL (ref 0–0.3)
BILIRUBIN, INDIRECT: 1 MG/DL (ref 0–1)
BUN BLDV-MCNC: 14 MG/DL (ref 7–20)
CALCIUM SERPL-MCNC: 8.5 MG/DL (ref 8.3–10.6)
CHLORIDE BLD-SCNC: 108 MMOL/L (ref 99–110)
CO2: 24 MMOL/L (ref 21–32)
CREAT SERPL-MCNC: 0.9 MG/DL (ref 0.6–1.2)
EOSINOPHILS ABSOLUTE: 0.3 K/UL (ref 0–0.6)
EOSINOPHILS RELATIVE PERCENT: 4.2 %
GFR AFRICAN AMERICAN: >60
GFR NON-AFRICAN AMERICAN: >60
GLUCOSE BLD-MCNC: 101 MG/DL (ref 70–99)
GLUCOSE BLD-MCNC: 102 MG/DL (ref 70–99)
GLUCOSE BLD-MCNC: 107 MG/DL (ref 70–99)
GLUCOSE BLD-MCNC: 159 MG/DL (ref 70–99)
GLUCOSE BLD-MCNC: 77 MG/DL (ref 70–99)
HCT VFR BLD CALC: 33.2 % (ref 36–48)
HEMOGLOBIN: 11.3 G/DL (ref 12–16)
LYMPHOCYTES ABSOLUTE: 1.7 K/UL (ref 1–5.1)
LYMPHOCYTES RELATIVE PERCENT: 28.6 %
MCH RBC QN AUTO: 31.1 PG (ref 26–34)
MCHC RBC AUTO-ENTMCNC: 34.1 G/DL (ref 31–36)
MCV RBC AUTO: 91.3 FL (ref 80–100)
MONOCYTES ABSOLUTE: 0.6 K/UL (ref 0–1.3)
MONOCYTES RELATIVE PERCENT: 10 %
NEUTROPHILS ABSOLUTE: 3.4 K/UL (ref 1.7–7.7)
NEUTROPHILS RELATIVE PERCENT: 56.7 %
PDW BLD-RTO: 12.9 % (ref 12.4–15.4)
PERFORMED ON: ABNORMAL
PERFORMED ON: NORMAL
PLATELET # BLD: 234 K/UL (ref 135–450)
PMV BLD AUTO: 6.9 FL (ref 5–10.5)
POTASSIUM SERPL-SCNC: 4.1 MMOL/L (ref 3.5–5.1)
RBC # BLD: 3.64 M/UL (ref 4–5.2)
SODIUM BLD-SCNC: 141 MMOL/L (ref 136–145)
TOTAL PROTEIN: 6 G/DL (ref 6.4–8.2)
WBC # BLD: 6 K/UL (ref 4–11)

## 2019-02-03 PROCEDURE — 6370000000 HC RX 637 (ALT 250 FOR IP): Performed by: NURSE PRACTITIONER

## 2019-02-03 PROCEDURE — 36415 COLL VENOUS BLD VENIPUNCTURE: CPT

## 2019-02-03 PROCEDURE — 6370000000 HC RX 637 (ALT 250 FOR IP): Performed by: HOSPITALIST

## 2019-02-03 PROCEDURE — 80076 HEPATIC FUNCTION PANEL: CPT

## 2019-02-03 PROCEDURE — 1200000000 HC SEMI PRIVATE

## 2019-02-03 PROCEDURE — 2580000003 HC RX 258: Performed by: HOSPITALIST

## 2019-02-03 PROCEDURE — 6360000002 HC RX W HCPCS: Performed by: NURSE PRACTITIONER

## 2019-02-03 PROCEDURE — 80048 BASIC METABOLIC PNL TOTAL CA: CPT

## 2019-02-03 PROCEDURE — 6370000000 HC RX 637 (ALT 250 FOR IP)

## 2019-02-03 PROCEDURE — 6360000002 HC RX W HCPCS: Performed by: HOSPITALIST

## 2019-02-03 PROCEDURE — 85025 COMPLETE CBC W/AUTO DIFF WBC: CPT

## 2019-02-03 RX ORDER — CHOLECALCIFEROL (VITAMIN D3) 125 MCG
5 CAPSULE ORAL NIGHTLY PRN
Status: DISCONTINUED | OUTPATIENT
Start: 2019-02-03 | End: 2019-02-06 | Stop reason: HOSPADM

## 2019-02-03 RX ADMIN — LOSARTAN POTASSIUM 25 MG: 25 TABLET, FILM COATED ORAL at 09:21

## 2019-02-03 RX ADMIN — GABAPENTIN 200 MG: 100 CAPSULE ORAL at 09:21

## 2019-02-03 RX ADMIN — ATENOLOL 50 MG: 25 TABLET ORAL at 11:52

## 2019-02-03 RX ADMIN — PIPERACILLIN AND TAZOBACTAM 3.38 G: 3; .375 INJECTION, POWDER, FOR SOLUTION INTRAVENOUS at 22:26

## 2019-02-03 RX ADMIN — Medication: at 09:22

## 2019-02-03 RX ADMIN — INSULIN LISPRO 1 UNITS: 100 INJECTION, SOLUTION INTRAVENOUS; SUBCUTANEOUS at 15:38

## 2019-02-03 RX ADMIN — Medication 5 MG: at 22:35

## 2019-02-03 RX ADMIN — PIPERACILLIN AND TAZOBACTAM 3.38 G: 3; .375 INJECTION, POWDER, FOR SOLUTION INTRAVENOUS at 06:01

## 2019-02-03 RX ADMIN — ENOXAPARIN SODIUM 40 MG: 40 INJECTION SUBCUTANEOUS at 09:28

## 2019-02-03 RX ADMIN — GABAPENTIN 200 MG: 100 CAPSULE ORAL at 22:25

## 2019-02-03 RX ADMIN — SODIUM CHLORIDE: 9 INJECTION, SOLUTION INTRAVENOUS at 22:29

## 2019-02-03 RX ADMIN — PIPERACILLIN AND TAZOBACTAM 3.38 G: 3; .375 INJECTION, POWDER, FOR SOLUTION INTRAVENOUS at 15:47

## 2019-02-03 ASSESSMENT — PAIN SCALES - GENERAL
PAINLEVEL_OUTOF10: 0

## 2019-02-04 ENCOUNTER — ANESTHESIA EVENT (OUTPATIENT)
Dept: OPERATING ROOM | Age: 84
DRG: 444 | End: 2019-02-04
Payer: MEDICARE

## 2019-02-04 ENCOUNTER — ANESTHESIA (OUTPATIENT)
Dept: OPERATING ROOM | Age: 84
DRG: 444 | End: 2019-02-04
Payer: MEDICARE

## 2019-02-04 ENCOUNTER — APPOINTMENT (OUTPATIENT)
Dept: GENERAL RADIOLOGY | Age: 84
DRG: 444 | End: 2019-02-04
Payer: MEDICARE

## 2019-02-04 VITALS — DIASTOLIC BLOOD PRESSURE: 72 MMHG | SYSTOLIC BLOOD PRESSURE: 119 MMHG | OXYGEN SATURATION: 100 %

## 2019-02-04 LAB
GLUCOSE BLD-MCNC: 102 MG/DL (ref 70–99)
GLUCOSE BLD-MCNC: 107 MG/DL (ref 70–99)
GLUCOSE BLD-MCNC: 90 MG/DL (ref 70–99)
GLUCOSE BLD-MCNC: 97 MG/DL (ref 70–99)
PERFORMED ON: ABNORMAL
PERFORMED ON: ABNORMAL
PERFORMED ON: NORMAL
PERFORMED ON: NORMAL

## 2019-02-04 PROCEDURE — 2500000003 HC RX 250 WO HCPCS: Performed by: NURSE ANESTHETIST, CERTIFIED REGISTERED

## 2019-02-04 PROCEDURE — 3700000001 HC ADD 15 MINUTES (ANESTHESIA): Performed by: INTERNAL MEDICINE

## 2019-02-04 PROCEDURE — 3700000000 HC ANESTHESIA ATTENDED CARE: Performed by: INTERNAL MEDICINE

## 2019-02-04 PROCEDURE — 3600007502: Performed by: INTERNAL MEDICINE

## 2019-02-04 PROCEDURE — 7100000001 HC PACU RECOVERY - ADDTL 15 MIN: Performed by: INTERNAL MEDICINE

## 2019-02-04 PROCEDURE — 2720000010 HC SURG SUPPLY STERILE: Performed by: INTERNAL MEDICINE

## 2019-02-04 PROCEDURE — 3600007512: Performed by: INTERNAL MEDICINE

## 2019-02-04 PROCEDURE — 3209999900 FLUORO FOR SURGICAL PROCEDURES

## 2019-02-04 PROCEDURE — 1200000000 HC SEMI PRIVATE

## 2019-02-04 PROCEDURE — 2580000003 HC RX 258: Performed by: HOSPITALIST

## 2019-02-04 PROCEDURE — BF141ZZ FLUOROSCOPY OF GALLBLADDER, BILE DUCTS AND PANCREATIC DUCTS USING LOW OSMOLAR CONTRAST: ICD-10-PCS | Performed by: INTERNAL MEDICINE

## 2019-02-04 PROCEDURE — 6360000002 HC RX W HCPCS: Performed by: HOSPITALIST

## 2019-02-04 PROCEDURE — 6370000000 HC RX 637 (ALT 250 FOR IP): Performed by: INTERNAL MEDICINE

## 2019-02-04 PROCEDURE — 2709999900 HC NON-CHARGEABLE SUPPLY: Performed by: INTERNAL MEDICINE

## 2019-02-04 PROCEDURE — 74330 X-RAY BILE/PANC ENDOSCOPY: CPT

## 2019-02-04 PROCEDURE — 2580000003 HC RX 258: Performed by: NURSE ANESTHETIST, CERTIFIED REGISTERED

## 2019-02-04 PROCEDURE — 6370000000 HC RX 637 (ALT 250 FOR IP): Performed by: NURSE PRACTITIONER

## 2019-02-04 PROCEDURE — 7100000000 HC PACU RECOVERY - FIRST 15 MIN: Performed by: INTERNAL MEDICINE

## 2019-02-04 PROCEDURE — 6360000002 HC RX W HCPCS: Performed by: NURSE PRACTITIONER

## 2019-02-04 PROCEDURE — 0FC98ZZ EXTIRPATION OF MATTER FROM COMMON BILE DUCT, VIA NATURAL OR ARTIFICIAL OPENING ENDOSCOPIC: ICD-10-PCS | Performed by: INTERNAL MEDICINE

## 2019-02-04 PROCEDURE — 6370000000 HC RX 637 (ALT 250 FOR IP): Performed by: HOSPITALIST

## 2019-02-04 PROCEDURE — 6360000002 HC RX W HCPCS: Performed by: NURSE ANESTHETIST, CERTIFIED REGISTERED

## 2019-02-04 PROCEDURE — 2580000003 HC RX 258: Performed by: NURSE PRACTITIONER

## 2019-02-04 RX ORDER — FENTANYL CITRATE 50 UG/ML
INJECTION, SOLUTION INTRAMUSCULAR; INTRAVENOUS PRN
Status: DISCONTINUED | OUTPATIENT
Start: 2019-02-04 | End: 2019-02-04 | Stop reason: SDUPTHER

## 2019-02-04 RX ORDER — HYDROMORPHONE HCL 110MG/55ML
0.5 PATIENT CONTROLLED ANALGESIA SYRINGE INTRAVENOUS EVERY 5 MIN PRN
Status: DISCONTINUED | OUTPATIENT
Start: 2019-02-04 | End: 2019-02-04 | Stop reason: HOSPADM

## 2019-02-04 RX ORDER — ONDANSETRON 2 MG/ML
4 INJECTION INTRAMUSCULAR; INTRAVENOUS PRN
Status: DISCONTINUED | OUTPATIENT
Start: 2019-02-04 | End: 2019-02-04 | Stop reason: HOSPADM

## 2019-02-04 RX ORDER — DEXAMETHASONE SODIUM PHOSPHATE 4 MG/ML
INJECTION, SOLUTION INTRA-ARTICULAR; INTRALESIONAL; INTRAMUSCULAR; INTRAVENOUS; SOFT TISSUE PRN
Status: DISCONTINUED | OUTPATIENT
Start: 2019-02-04 | End: 2019-02-04 | Stop reason: SDUPTHER

## 2019-02-04 RX ORDER — ROCURONIUM BROMIDE 10 MG/ML
INJECTION, SOLUTION INTRAVENOUS PRN
Status: DISCONTINUED | OUTPATIENT
Start: 2019-02-04 | End: 2019-02-04 | Stop reason: SDUPTHER

## 2019-02-04 RX ORDER — OXYCODONE HYDROCHLORIDE AND ACETAMINOPHEN 5; 325 MG/1; MG/1
1 TABLET ORAL PRN
Status: DISCONTINUED | OUTPATIENT
Start: 2019-02-04 | End: 2019-02-04 | Stop reason: HOSPADM

## 2019-02-04 RX ORDER — LABETALOL HYDROCHLORIDE 5 MG/ML
5 INJECTION, SOLUTION INTRAVENOUS EVERY 10 MIN PRN
Status: DISCONTINUED | OUTPATIENT
Start: 2019-02-04 | End: 2019-02-04 | Stop reason: HOSPADM

## 2019-02-04 RX ORDER — OXYCODONE HYDROCHLORIDE AND ACETAMINOPHEN 5; 325 MG/1; MG/1
2 TABLET ORAL PRN
Status: DISCONTINUED | OUTPATIENT
Start: 2019-02-04 | End: 2019-02-04 | Stop reason: HOSPADM

## 2019-02-04 RX ORDER — MEPERIDINE HYDROCHLORIDE 50 MG/ML
12.5 INJECTION INTRAMUSCULAR; INTRAVENOUS; SUBCUTANEOUS EVERY 5 MIN PRN
Status: DISCONTINUED | OUTPATIENT
Start: 2019-02-04 | End: 2019-02-04 | Stop reason: HOSPADM

## 2019-02-04 RX ORDER — HYDRALAZINE HYDROCHLORIDE 20 MG/ML
5 INJECTION INTRAMUSCULAR; INTRAVENOUS EVERY 10 MIN PRN
Status: DISCONTINUED | OUTPATIENT
Start: 2019-02-04 | End: 2019-02-04 | Stop reason: HOSPADM

## 2019-02-04 RX ORDER — PROMETHAZINE HYDROCHLORIDE 25 MG/ML
6.25 INJECTION, SOLUTION INTRAMUSCULAR; INTRAVENOUS
Status: DISCONTINUED | OUTPATIENT
Start: 2019-02-04 | End: 2019-02-04 | Stop reason: HOSPADM

## 2019-02-04 RX ORDER — ONDANSETRON 2 MG/ML
INJECTION INTRAMUSCULAR; INTRAVENOUS PRN
Status: DISCONTINUED | OUTPATIENT
Start: 2019-02-04 | End: 2019-02-04 | Stop reason: SDUPTHER

## 2019-02-04 RX ORDER — MORPHINE SULFATE 4 MG/ML
1 INJECTION, SOLUTION INTRAMUSCULAR; INTRAVENOUS EVERY 5 MIN PRN
Status: DISCONTINUED | OUTPATIENT
Start: 2019-02-04 | End: 2019-02-04 | Stop reason: HOSPADM

## 2019-02-04 RX ORDER — LIDOCAINE HYDROCHLORIDE 20 MG/ML
INJECTION, SOLUTION INFILTRATION; PERINEURAL PRN
Status: DISCONTINUED | OUTPATIENT
Start: 2019-02-04 | End: 2019-02-04 | Stop reason: SDUPTHER

## 2019-02-04 RX ORDER — MORPHINE SULFATE 4 MG/ML
2 INJECTION, SOLUTION INTRAMUSCULAR; INTRAVENOUS EVERY 5 MIN PRN
Status: DISCONTINUED | OUTPATIENT
Start: 2019-02-04 | End: 2019-02-04 | Stop reason: HOSPADM

## 2019-02-04 RX ORDER — PROPOFOL 10 MG/ML
INJECTION, EMULSION INTRAVENOUS PRN
Status: DISCONTINUED | OUTPATIENT
Start: 2019-02-04 | End: 2019-02-04 | Stop reason: SDUPTHER

## 2019-02-04 RX ORDER — SODIUM CHLORIDE, SODIUM LACTATE, POTASSIUM CHLORIDE, CALCIUM CHLORIDE 600; 310; 30; 20 MG/100ML; MG/100ML; MG/100ML; MG/100ML
INJECTION, SOLUTION INTRAVENOUS CONTINUOUS PRN
Status: DISCONTINUED | OUTPATIENT
Start: 2019-02-04 | End: 2019-02-04 | Stop reason: SDUPTHER

## 2019-02-04 RX ORDER — HYDROMORPHONE HCL 110MG/55ML
0.25 PATIENT CONTROLLED ANALGESIA SYRINGE INTRAVENOUS EVERY 5 MIN PRN
Status: DISCONTINUED | OUTPATIENT
Start: 2019-02-04 | End: 2019-02-04 | Stop reason: HOSPADM

## 2019-02-04 RX ORDER — DIPHENHYDRAMINE HYDROCHLORIDE 50 MG/ML
12.5 INJECTION INTRAMUSCULAR; INTRAVENOUS
Status: DISCONTINUED | OUTPATIENT
Start: 2019-02-04 | End: 2019-02-04 | Stop reason: HOSPADM

## 2019-02-04 RX ADMIN — INDOMETHACIN 25 MG: 50 SUPPOSITORY RECTAL at 15:39

## 2019-02-04 RX ADMIN — ONDANSETRON 4 MG: 2 INJECTION INTRAMUSCULAR; INTRAVENOUS at 18:36

## 2019-02-04 RX ADMIN — SUGAMMADEX 150 MG: 100 INJECTION, SOLUTION INTRAVENOUS at 15:41

## 2019-02-04 RX ADMIN — SODIUM CHLORIDE: 9 INJECTION, SOLUTION INTRAVENOUS at 18:36

## 2019-02-04 RX ADMIN — FENTANYL CITRATE 50 MCG: 50 INJECTION INTRAMUSCULAR; INTRAVENOUS at 15:03

## 2019-02-04 RX ADMIN — LIDOCAINE HYDROCHLORIDE 40 MG: 20 INJECTION, SOLUTION INFILTRATION; PERINEURAL at 15:03

## 2019-02-04 RX ADMIN — LOSARTAN POTASSIUM 25 MG: 25 TABLET, FILM COATED ORAL at 08:24

## 2019-02-04 RX ADMIN — PIPERACILLIN AND TAZOBACTAM 3.38 G: 3; .375 INJECTION, POWDER, FOR SOLUTION INTRAVENOUS at 05:53

## 2019-02-04 RX ADMIN — GLUCAGON HYDROCHLORIDE 0.3 MG: KIT at 15:18

## 2019-02-04 RX ADMIN — SODIUM CHLORIDE, POTASSIUM CHLORIDE, SODIUM LACTATE AND CALCIUM CHLORIDE: 600; 310; 30; 20 INJECTION, SOLUTION INTRAVENOUS at 15:03

## 2019-02-04 RX ADMIN — DEXAMETHASONE SODIUM PHOSPHATE 4 MG: 4 INJECTION, SOLUTION INTRAMUSCULAR; INTRAVENOUS at 15:27

## 2019-02-04 RX ADMIN — GABAPENTIN 200 MG: 100 CAPSULE ORAL at 21:01

## 2019-02-04 RX ADMIN — ONDANSETRON 4 MG: 2 INJECTION INTRAMUSCULAR; INTRAVENOUS at 15:27

## 2019-02-04 RX ADMIN — ATENOLOL 50 MG: 25 TABLET ORAL at 08:25

## 2019-02-04 RX ADMIN — GABAPENTIN 200 MG: 100 CAPSULE ORAL at 08:24

## 2019-02-04 RX ADMIN — PIPERACILLIN AND TAZOBACTAM 3.38 G: 3; .375 INJECTION, POWDER, FOR SOLUTION INTRAVENOUS at 22:56

## 2019-02-04 RX ADMIN — PROPOFOL 120 MG: 10 INJECTION, EMULSION INTRAVENOUS at 15:03

## 2019-02-04 RX ADMIN — SODIUM CHLORIDE, PRESERVATIVE FREE 10 ML: 5 INJECTION INTRAVENOUS at 08:25

## 2019-02-04 RX ADMIN — PIPERACILLIN AND TAZOBACTAM 3.38 G: 3; .375 INJECTION, POWDER, FOR SOLUTION INTRAVENOUS at 14:08

## 2019-02-04 RX ADMIN — ROCURONIUM BROMIDE 40 MG: 10 SOLUTION INTRAVENOUS at 15:03

## 2019-02-04 ASSESSMENT — PULMONARY FUNCTION TESTS
PIF_VALUE: 26
PIF_VALUE: 30
PIF_VALUE: 0
PIF_VALUE: 20
PIF_VALUE: 25
PIF_VALUE: 32
PIF_VALUE: 0
PIF_VALUE: 32
PIF_VALUE: 32
PIF_VALUE: 34
PIF_VALUE: 32
PIF_VALUE: 25
PIF_VALUE: 26
PIF_VALUE: 30
PIF_VALUE: 26
PIF_VALUE: 25
PIF_VALUE: 0
PIF_VALUE: 32
PIF_VALUE: 0
PIF_VALUE: 0
PIF_VALUE: 21
PIF_VALUE: 0
PIF_VALUE: 0
PIF_VALUE: 26
PIF_VALUE: 17
PIF_VALUE: 13
PIF_VALUE: 0
PIF_VALUE: 9
PIF_VALUE: 25
PIF_VALUE: 21
PIF_VALUE: 23
PIF_VALUE: 1
PIF_VALUE: 0
PIF_VALUE: 0
PIF_VALUE: 25
PIF_VALUE: 26
PIF_VALUE: 28
PIF_VALUE: 25
PIF_VALUE: 0
PIF_VALUE: 35
PIF_VALUE: 0
PIF_VALUE: 24
PIF_VALUE: 7
PIF_VALUE: 32
PIF_VALUE: 20
PIF_VALUE: 4
PIF_VALUE: 30
PIF_VALUE: 32
PIF_VALUE: 0
PIF_VALUE: 24
PIF_VALUE: 31
PIF_VALUE: 35
PIF_VALUE: 17
PIF_VALUE: 25
PIF_VALUE: 0
PIF_VALUE: 23

## 2019-02-05 LAB
ALBUMIN SERPL-MCNC: 3.4 G/DL (ref 3.4–5)
ALP BLD-CCNC: 70 U/L (ref 40–129)
ALT SERPL-CCNC: 30 U/L (ref 10–40)
ANION GAP SERPL CALCULATED.3IONS-SCNC: 12 MMOL/L (ref 3–16)
AST SERPL-CCNC: 26 U/L (ref 15–37)
BASOPHILS ABSOLUTE: 0 K/UL (ref 0–0.2)
BASOPHILS RELATIVE PERCENT: 0.5 %
BILIRUB SERPL-MCNC: 0.7 MG/DL (ref 0–1)
BILIRUBIN DIRECT: <0.2 MG/DL (ref 0–0.3)
BILIRUBIN, INDIRECT: ABNORMAL MG/DL (ref 0–1)
BUN BLDV-MCNC: 18 MG/DL (ref 7–20)
CALCIUM SERPL-MCNC: 8.2 MG/DL (ref 8.3–10.6)
CHLORIDE BLD-SCNC: 108 MMOL/L (ref 99–110)
CO2: 19 MMOL/L (ref 21–32)
CREAT SERPL-MCNC: 0.9 MG/DL (ref 0.6–1.2)
EOSINOPHILS ABSOLUTE: 0 K/UL (ref 0–0.6)
EOSINOPHILS RELATIVE PERCENT: 0.1 %
GFR AFRICAN AMERICAN: >60
GFR NON-AFRICAN AMERICAN: >60
GLUCOSE BLD-MCNC: 104 MG/DL (ref 70–99)
GLUCOSE BLD-MCNC: 152 MG/DL (ref 70–99)
GLUCOSE BLD-MCNC: 184 MG/DL (ref 70–99)
GLUCOSE BLD-MCNC: 193 MG/DL (ref 70–99)
GLUCOSE BLD-MCNC: 82 MG/DL (ref 70–99)
HCT VFR BLD CALC: 34.3 % (ref 36–48)
HEMOGLOBIN: 11.7 G/DL (ref 12–16)
INR BLD: 1.11 (ref 0.86–1.14)
LIPASE: 223 U/L (ref 13–60)
LYMPHOCYTES ABSOLUTE: 1.3 K/UL (ref 1–5.1)
LYMPHOCYTES RELATIVE PERCENT: 20 %
MAGNESIUM: 1.7 MG/DL (ref 1.8–2.4)
MCH RBC QN AUTO: 30.9 PG (ref 26–34)
MCHC RBC AUTO-ENTMCNC: 34.2 G/DL (ref 31–36)
MCV RBC AUTO: 90.4 FL (ref 80–100)
MONOCYTES ABSOLUTE: 0.5 K/UL (ref 0–1.3)
MONOCYTES RELATIVE PERCENT: 7.4 %
NEUTROPHILS ABSOLUTE: 4.8 K/UL (ref 1.7–7.7)
NEUTROPHILS RELATIVE PERCENT: 72 %
PDW BLD-RTO: 13 % (ref 12.4–15.4)
PERFORMED ON: ABNORMAL
PERFORMED ON: NORMAL
PHOSPHORUS: 4 MG/DL (ref 2.5–4.9)
PLATELET # BLD: 256 K/UL (ref 135–450)
PMV BLD AUTO: 7.2 FL (ref 5–10.5)
POTASSIUM SERPL-SCNC: 4 MMOL/L (ref 3.5–5.1)
PROTHROMBIN TIME: 12.7 SEC (ref 9.8–13)
RBC # BLD: 3.8 M/UL (ref 4–5.2)
SODIUM BLD-SCNC: 139 MMOL/L (ref 136–145)
TOTAL PROTEIN: 6.1 G/DL (ref 6.4–8.2)
WBC # BLD: 6.7 K/UL (ref 4–11)

## 2019-02-05 PROCEDURE — 6370000000 HC RX 637 (ALT 250 FOR IP): Performed by: NURSE PRACTITIONER

## 2019-02-05 PROCEDURE — 1200000000 HC SEMI PRIVATE

## 2019-02-05 PROCEDURE — 6370000000 HC RX 637 (ALT 250 FOR IP): Performed by: HOSPITALIST

## 2019-02-05 PROCEDURE — 2580000003 HC RX 258: Performed by: HOSPITALIST

## 2019-02-05 PROCEDURE — 80048 BASIC METABOLIC PNL TOTAL CA: CPT

## 2019-02-05 PROCEDURE — 97110 THERAPEUTIC EXERCISES: CPT

## 2019-02-05 PROCEDURE — 97161 PT EVAL LOW COMPLEX 20 MIN: CPT

## 2019-02-05 PROCEDURE — 84100 ASSAY OF PHOSPHORUS: CPT

## 2019-02-05 PROCEDURE — 83690 ASSAY OF LIPASE: CPT

## 2019-02-05 PROCEDURE — 85025 COMPLETE CBC W/AUTO DIFF WBC: CPT

## 2019-02-05 PROCEDURE — 85610 PROTHROMBIN TIME: CPT

## 2019-02-05 PROCEDURE — 97165 OT EVAL LOW COMPLEX 30 MIN: CPT

## 2019-02-05 PROCEDURE — 6360000002 HC RX W HCPCS: Performed by: NURSE PRACTITIONER

## 2019-02-05 PROCEDURE — 6360000002 HC RX W HCPCS: Performed by: HOSPITALIST

## 2019-02-05 PROCEDURE — 99222 1ST HOSP IP/OBS MODERATE 55: CPT | Performed by: SURGERY

## 2019-02-05 PROCEDURE — 80076 HEPATIC FUNCTION PANEL: CPT

## 2019-02-05 PROCEDURE — 97530 THERAPEUTIC ACTIVITIES: CPT

## 2019-02-05 PROCEDURE — 6360000002 HC RX W HCPCS: Performed by: REGISTERED NURSE

## 2019-02-05 PROCEDURE — 36415 COLL VENOUS BLD VENIPUNCTURE: CPT

## 2019-02-05 PROCEDURE — 83735 ASSAY OF MAGNESIUM: CPT

## 2019-02-05 RX ORDER — MAGNESIUM SULFATE 1 G/100ML
1 INJECTION INTRAVENOUS ONCE
Status: COMPLETED | OUTPATIENT
Start: 2019-02-05 | End: 2019-02-05

## 2019-02-05 RX ADMIN — GABAPENTIN 200 MG: 100 CAPSULE ORAL at 10:39

## 2019-02-05 RX ADMIN — PIPERACILLIN AND TAZOBACTAM 3.38 G: 3; .375 INJECTION, POWDER, FOR SOLUTION INTRAVENOUS at 06:40

## 2019-02-05 RX ADMIN — INSULIN LISPRO 1 UNITS: 100 INJECTION, SOLUTION INTRAVENOUS; SUBCUTANEOUS at 20:37

## 2019-02-05 RX ADMIN — MAGNESIUM SULFATE HEPTAHYDRATE 1 G: 1 INJECTION, SOLUTION INTRAVENOUS at 12:01

## 2019-02-05 RX ADMIN — ENOXAPARIN SODIUM 40 MG: 40 INJECTION SUBCUTANEOUS at 10:38

## 2019-02-05 RX ADMIN — LOSARTAN POTASSIUM 25 MG: 25 TABLET, FILM COATED ORAL at 10:38

## 2019-02-05 RX ADMIN — GABAPENTIN 200 MG: 100 CAPSULE ORAL at 20:30

## 2019-02-05 RX ADMIN — PIPERACILLIN AND TAZOBACTAM 3.38 G: 3; .375 INJECTION, POWDER, FOR SOLUTION INTRAVENOUS at 15:44

## 2019-02-05 RX ADMIN — ATENOLOL 50 MG: 25 TABLET ORAL at 10:39

## 2019-02-05 RX ADMIN — INSULIN LISPRO 1 UNITS: 100 INJECTION, SOLUTION INTRAVENOUS; SUBCUTANEOUS at 12:09

## 2019-02-06 VITALS
HEART RATE: 56 BPM | OXYGEN SATURATION: 98 % | DIASTOLIC BLOOD PRESSURE: 80 MMHG | BODY MASS INDEX: 24.84 KG/M2 | WEIGHT: 135 LBS | SYSTOLIC BLOOD PRESSURE: 165 MMHG | RESPIRATION RATE: 18 BRPM | HEIGHT: 62 IN | TEMPERATURE: 98 F

## 2019-02-06 LAB
GLUCOSE BLD-MCNC: 114 MG/DL (ref 70–99)
GLUCOSE BLD-MCNC: 185 MG/DL (ref 70–99)
PERFORMED ON: ABNORMAL
PERFORMED ON: ABNORMAL

## 2019-02-06 PROCEDURE — 6360000002 HC RX W HCPCS: Performed by: NURSE PRACTITIONER

## 2019-02-06 PROCEDURE — 2580000003 HC RX 258: Performed by: NURSE PRACTITIONER

## 2019-02-06 PROCEDURE — 6370000000 HC RX 637 (ALT 250 FOR IP): Performed by: NURSE PRACTITIONER

## 2019-02-06 PROCEDURE — 99231 SBSQ HOSP IP/OBS SF/LOW 25: CPT | Performed by: SURGERY

## 2019-02-06 PROCEDURE — 2580000003 HC RX 258: Performed by: HOSPITALIST

## 2019-02-06 PROCEDURE — APPSS45 APP SPLIT SHARED TIME 31-45 MINUTES: Performed by: CLINICAL NURSE SPECIALIST

## 2019-02-06 PROCEDURE — 6370000000 HC RX 637 (ALT 250 FOR IP): Performed by: HOSPITALIST

## 2019-02-06 RX ADMIN — SODIUM CHLORIDE, PRESERVATIVE FREE 10 ML: 5 INJECTION INTRAVENOUS at 10:30

## 2019-02-06 RX ADMIN — LOSARTAN POTASSIUM 25 MG: 25 TABLET, FILM COATED ORAL at 10:29

## 2019-02-06 RX ADMIN — SODIUM CHLORIDE: 9 INJECTION, SOLUTION INTRAVENOUS at 00:05

## 2019-02-06 RX ADMIN — ATENOLOL 50 MG: 25 TABLET ORAL at 10:28

## 2019-02-06 RX ADMIN — ENOXAPARIN SODIUM 40 MG: 40 INJECTION SUBCUTANEOUS at 10:29

## 2019-02-06 RX ADMIN — Medication 5 MG: at 00:05

## 2019-02-06 RX ADMIN — INSULIN LISPRO 1 UNITS: 100 INJECTION, SOLUTION INTRAVENOUS; SUBCUTANEOUS at 13:05

## 2019-02-06 RX ADMIN — GABAPENTIN 200 MG: 100 CAPSULE ORAL at 10:29

## 2019-02-06 ASSESSMENT — PAIN DESCRIPTION - PAIN TYPE: TYPE: ACUTE PAIN

## 2019-02-06 ASSESSMENT — PAIN SCALES - GENERAL
PAINLEVEL_OUTOF10: 0
PAINLEVEL_OUTOF10: 0

## 2019-02-06 ASSESSMENT — PAIN DESCRIPTION - LOCATION: LOCATION: ABDOMEN

## 2019-02-07 ENCOUNTER — TELEPHONE (OUTPATIENT)
Dept: SURGERY | Age: 84
End: 2019-02-07

## 2019-02-12 RX ORDER — OLMESARTAN MEDOXOMIL AND HYDROCHLOROTHIAZIDE 20/12.5 20; 12.5 MG/1; MG/1
1 TABLET ORAL DAILY
COMMUNITY
End: 2019-02-13

## 2019-02-13 ENCOUNTER — OFFICE VISIT (OUTPATIENT)
Dept: NEUROLOGY | Age: 84
End: 2019-02-13
Payer: MEDICARE

## 2019-02-13 VITALS
OXYGEN SATURATION: 96 % | HEIGHT: 63 IN | BODY MASS INDEX: 19.49 KG/M2 | SYSTOLIC BLOOD PRESSURE: 114 MMHG | HEART RATE: 50 BPM | WEIGHT: 110 LBS | DIASTOLIC BLOOD PRESSURE: 59 MMHG

## 2019-02-13 DIAGNOSIS — G50.0 TRIGEMINAL NEURALGIA: Primary | ICD-10-CM

## 2019-02-13 DIAGNOSIS — R22.1 NECK MASS: ICD-10-CM

## 2019-02-13 DIAGNOSIS — I10 HTN (HYPERTENSION), BENIGN: ICD-10-CM

## 2019-02-13 PROCEDURE — 1090F PRES/ABSN URINE INCON ASSESS: CPT | Performed by: PSYCHIATRY & NEUROLOGY

## 2019-02-13 PROCEDURE — 99213 OFFICE O/P EST LOW 20 MIN: CPT | Performed by: PSYCHIATRY & NEUROLOGY

## 2019-02-13 PROCEDURE — G8427 DOCREV CUR MEDS BY ELIG CLIN: HCPCS | Performed by: PSYCHIATRY & NEUROLOGY

## 2019-02-13 PROCEDURE — 4040F PNEUMOC VAC/ADMIN/RCVD: CPT | Performed by: PSYCHIATRY & NEUROLOGY

## 2019-02-13 PROCEDURE — 1123F ACP DISCUSS/DSCN MKR DOCD: CPT | Performed by: PSYCHIATRY & NEUROLOGY

## 2019-02-13 PROCEDURE — 1111F DSCHRG MED/CURRENT MED MERGE: CPT | Performed by: PSYCHIATRY & NEUROLOGY

## 2019-02-13 PROCEDURE — 1036F TOBACCO NON-USER: CPT | Performed by: PSYCHIATRY & NEUROLOGY

## 2019-02-13 PROCEDURE — G8420 CALC BMI NORM PARAMETERS: HCPCS | Performed by: PSYCHIATRY & NEUROLOGY

## 2019-02-13 PROCEDURE — 1101F PT FALLS ASSESS-DOCD LE1/YR: CPT | Performed by: PSYCHIATRY & NEUROLOGY

## 2019-02-13 PROCEDURE — G8484 FLU IMMUNIZE NO ADMIN: HCPCS | Performed by: PSYCHIATRY & NEUROLOGY

## 2019-02-13 PROCEDURE — G8400 PT W/DXA NO RESULTS DOC: HCPCS | Performed by: PSYCHIATRY & NEUROLOGY

## 2019-02-19 ENCOUNTER — ANESTHESIA EVENT (OUTPATIENT)
Dept: OPERATING ROOM | Age: 84
End: 2019-02-19
Payer: MEDICARE

## 2019-02-19 ENCOUNTER — HOSPITAL ENCOUNTER (OUTPATIENT)
Age: 84
Setting detail: OUTPATIENT SURGERY
Discharge: HOME | End: 2019-02-19
Attending: SURGERY | Admitting: SURGERY
Payer: MEDICARE

## 2019-02-19 ENCOUNTER — HOSPITAL ENCOUNTER (OUTPATIENT)
Dept: GENERAL RADIOLOGY | Age: 84
Setting detail: OUTPATIENT SURGERY
Discharge: HOME | End: 2019-02-19
Attending: SURGERY
Payer: MEDICARE

## 2019-02-19 ENCOUNTER — ANESTHESIA (OUTPATIENT)
Dept: OPERATING ROOM | Age: 84
End: 2019-02-19
Payer: MEDICARE

## 2019-02-19 VITALS
RESPIRATION RATE: 16 BRPM | OXYGEN SATURATION: 95 % | HEIGHT: 62 IN | SYSTOLIC BLOOD PRESSURE: 138 MMHG | DIASTOLIC BLOOD PRESSURE: 78 MMHG | HEART RATE: 81 BPM | TEMPERATURE: 97.8 F | WEIGHT: 111.5 LBS | BODY MASS INDEX: 20.52 KG/M2

## 2019-02-19 VITALS
OXYGEN SATURATION: 100 % | SYSTOLIC BLOOD PRESSURE: 118 MMHG | DIASTOLIC BLOOD PRESSURE: 61 MMHG | RESPIRATION RATE: 20 BRPM | TEMPERATURE: 98.6 F

## 2019-02-19 DIAGNOSIS — R52 PAIN: ICD-10-CM

## 2019-02-19 DIAGNOSIS — Z90.49 S/P LAPAROSCOPIC CHOLECYSTECTOMY: Primary | ICD-10-CM

## 2019-02-19 DIAGNOSIS — K85.10 ACUTE BILIARY PANCREATITIS WITHOUT INFECTION OR NECROSIS: ICD-10-CM

## 2019-02-19 PROCEDURE — 47563 LAPARO CHOLECYSTECTOMY/GRAPH: CPT | Performed by: SURGERY

## 2019-02-19 PROCEDURE — 6360000002 HC RX W HCPCS: Performed by: SURGERY

## 2019-02-19 PROCEDURE — 3700000001 HC ADD 15 MINUTES (ANESTHESIA): Performed by: SURGERY

## 2019-02-19 PROCEDURE — 3700000000 HC ANESTHESIA ATTENDED CARE: Performed by: SURGERY

## 2019-02-19 PROCEDURE — 88304 TISSUE EXAM BY PATHOLOGIST: CPT

## 2019-02-19 PROCEDURE — 7100000000 HC PACU RECOVERY - FIRST 15 MIN: Performed by: SURGERY

## 2019-02-19 PROCEDURE — 2500000003 HC RX 250 WO HCPCS: Performed by: NURSE ANESTHETIST, CERTIFIED REGISTERED

## 2019-02-19 PROCEDURE — 7100000011 HC PHASE II RECOVERY - ADDTL 15 MIN: Performed by: SURGERY

## 2019-02-19 PROCEDURE — 7100000001 HC PACU RECOVERY - ADDTL 15 MIN: Performed by: SURGERY

## 2019-02-19 PROCEDURE — 3209999900 FLUORO FOR SURGICAL PROCEDURES

## 2019-02-19 PROCEDURE — 7100000010 HC PHASE II RECOVERY - FIRST 15 MIN: Performed by: SURGERY

## 2019-02-19 PROCEDURE — 6360000002 HC RX W HCPCS: Performed by: NURSE ANESTHETIST, CERTIFIED REGISTERED

## 2019-02-19 PROCEDURE — 2580000003 HC RX 258: Performed by: SURGERY

## 2019-02-19 PROCEDURE — 2709999900 HC NON-CHARGEABLE SUPPLY: Performed by: SURGERY

## 2019-02-19 PROCEDURE — 3600000004 HC SURGERY LEVEL 4 BASE: Performed by: SURGERY

## 2019-02-19 PROCEDURE — 2500000003 HC RX 250 WO HCPCS: Performed by: SURGERY

## 2019-02-19 PROCEDURE — 6360000004 HC RX CONTRAST MEDICATION: Performed by: SURGERY

## 2019-02-19 PROCEDURE — 6370000000 HC RX 637 (ALT 250 FOR IP): Performed by: ANESTHESIOLOGY

## 2019-02-19 PROCEDURE — 74300 X-RAY BILE DUCTS/PANCREAS: CPT

## 2019-02-19 PROCEDURE — 3600000014 HC SURGERY LEVEL 4 ADDTL 15MIN: Performed by: SURGERY

## 2019-02-19 PROCEDURE — 2580000003 HC RX 258: Performed by: ANESTHESIOLOGY

## 2019-02-19 RX ORDER — MORPHINE SULFATE 4 MG/ML
1 INJECTION, SOLUTION INTRAMUSCULAR; INTRAVENOUS EVERY 5 MIN PRN
Status: DISCONTINUED | OUTPATIENT
Start: 2019-02-19 | End: 2019-02-19 | Stop reason: HOSPADM

## 2019-02-19 RX ORDER — PROPOFOL 10 MG/ML
INJECTION, EMULSION INTRAVENOUS PRN
Status: DISCONTINUED | OUTPATIENT
Start: 2019-02-19 | End: 2019-02-19 | Stop reason: SDUPTHER

## 2019-02-19 RX ORDER — SODIUM CHLORIDE 0.9 % (FLUSH) 0.9 %
10 SYRINGE (ML) INJECTION PRN
Status: DISCONTINUED | OUTPATIENT
Start: 2019-02-19 | End: 2019-02-19 | Stop reason: HOSPADM

## 2019-02-19 RX ORDER — ONDANSETRON 2 MG/ML
INJECTION INTRAMUSCULAR; INTRAVENOUS PRN
Status: DISCONTINUED | OUTPATIENT
Start: 2019-02-19 | End: 2019-02-19 | Stop reason: SDUPTHER

## 2019-02-19 RX ORDER — OXYCODONE HYDROCHLORIDE AND ACETAMINOPHEN 5; 325 MG/1; MG/1
1 TABLET ORAL EVERY 6 HOURS PRN
Qty: 20 TABLET | Refills: 0 | Status: SHIPPED | OUTPATIENT
Start: 2019-02-19 | End: 2019-02-24

## 2019-02-19 RX ORDER — ONDANSETRON 2 MG/ML
4 INJECTION INTRAMUSCULAR; INTRAVENOUS PRN
Status: DISCONTINUED | OUTPATIENT
Start: 2019-02-19 | End: 2019-02-19 | Stop reason: HOSPADM

## 2019-02-19 RX ORDER — OXYCODONE HYDROCHLORIDE AND ACETAMINOPHEN 5; 325 MG/1; MG/1
2 TABLET ORAL PRN
Status: COMPLETED | OUTPATIENT
Start: 2019-02-19 | End: 2019-02-19

## 2019-02-19 RX ORDER — ROCURONIUM BROMIDE 10 MG/ML
INJECTION, SOLUTION INTRAVENOUS PRN
Status: DISCONTINUED | OUTPATIENT
Start: 2019-02-19 | End: 2019-02-19 | Stop reason: SDUPTHER

## 2019-02-19 RX ORDER — SODIUM CHLORIDE, SODIUM LACTATE, POTASSIUM CHLORIDE, AND CALCIUM CHLORIDE .6; .31; .03; .02 G/100ML; G/100ML; G/100ML; G/100ML
IRRIGANT IRRIGATION PRN
Status: DISCONTINUED | OUTPATIENT
Start: 2019-02-19 | End: 2019-02-19 | Stop reason: ALTCHOICE

## 2019-02-19 RX ORDER — DIPHENHYDRAMINE HYDROCHLORIDE 50 MG/ML
12.5 INJECTION INTRAMUSCULAR; INTRAVENOUS
Status: DISCONTINUED | OUTPATIENT
Start: 2019-02-19 | End: 2019-02-19 | Stop reason: HOSPADM

## 2019-02-19 RX ORDER — HYDRALAZINE HYDROCHLORIDE 20 MG/ML
5 INJECTION INTRAMUSCULAR; INTRAVENOUS EVERY 10 MIN PRN
Status: DISCONTINUED | OUTPATIENT
Start: 2019-02-19 | End: 2019-02-19 | Stop reason: HOSPADM

## 2019-02-19 RX ORDER — DEXAMETHASONE SODIUM PHOSPHATE 4 MG/ML
INJECTION, SOLUTION INTRA-ARTICULAR; INTRALESIONAL; INTRAMUSCULAR; INTRAVENOUS; SOFT TISSUE PRN
Status: DISCONTINUED | OUTPATIENT
Start: 2019-02-19 | End: 2019-02-19 | Stop reason: SDUPTHER

## 2019-02-19 RX ORDER — LIDOCAINE HYDROCHLORIDE 10 MG/ML
0.3 INJECTION, SOLUTION EPIDURAL; INFILTRATION; INTRACAUDAL; PERINEURAL
Status: DISCONTINUED | OUTPATIENT
Start: 2019-02-19 | End: 2019-02-19 | Stop reason: HOSPADM

## 2019-02-19 RX ORDER — LIDOCAINE HYDROCHLORIDE 20 MG/ML
INJECTION, SOLUTION INFILTRATION; PERINEURAL PRN
Status: DISCONTINUED | OUTPATIENT
Start: 2019-02-19 | End: 2019-02-19 | Stop reason: SDUPTHER

## 2019-02-19 RX ORDER — LABETALOL HYDROCHLORIDE 5 MG/ML
5 INJECTION, SOLUTION INTRAVENOUS EVERY 10 MIN PRN
Status: DISCONTINUED | OUTPATIENT
Start: 2019-02-19 | End: 2019-02-19 | Stop reason: HOSPADM

## 2019-02-19 RX ORDER — BUPIVACAINE HYDROCHLORIDE AND EPINEPHRINE 5; 5 MG/ML; UG/ML
INJECTION, SOLUTION PERINEURAL PRN
Status: DISCONTINUED | OUTPATIENT
Start: 2019-02-19 | End: 2019-02-19 | Stop reason: ALTCHOICE

## 2019-02-19 RX ORDER — MEPERIDINE HYDROCHLORIDE 50 MG/ML
12.5 INJECTION INTRAMUSCULAR; INTRAVENOUS; SUBCUTANEOUS EVERY 5 MIN PRN
Status: DISCONTINUED | OUTPATIENT
Start: 2019-02-19 | End: 2019-02-19 | Stop reason: HOSPADM

## 2019-02-19 RX ORDER — FENTANYL CITRATE 50 UG/ML
INJECTION, SOLUTION INTRAMUSCULAR; INTRAVENOUS PRN
Status: DISCONTINUED | OUTPATIENT
Start: 2019-02-19 | End: 2019-02-19 | Stop reason: SDUPTHER

## 2019-02-19 RX ORDER — PROMETHAZINE HYDROCHLORIDE 25 MG/ML
6.25 INJECTION, SOLUTION INTRAMUSCULAR; INTRAVENOUS
Status: DISCONTINUED | OUTPATIENT
Start: 2019-02-19 | End: 2019-02-19 | Stop reason: HOSPADM

## 2019-02-19 RX ORDER — OXYCODONE HYDROCHLORIDE AND ACETAMINOPHEN 5; 325 MG/1; MG/1
1 TABLET ORAL PRN
Status: COMPLETED | OUTPATIENT
Start: 2019-02-19 | End: 2019-02-19

## 2019-02-19 RX ORDER — SODIUM CHLORIDE, SODIUM LACTATE, POTASSIUM CHLORIDE, CALCIUM CHLORIDE 600; 310; 30; 20 MG/100ML; MG/100ML; MG/100ML; MG/100ML
INJECTION, SOLUTION INTRAVENOUS CONTINUOUS
Status: DISCONTINUED | OUTPATIENT
Start: 2019-02-19 | End: 2019-02-19 | Stop reason: HOSPADM

## 2019-02-19 RX ORDER — EPHEDRINE SULFATE 50 MG/ML
INJECTION INTRAVENOUS PRN
Status: DISCONTINUED | OUTPATIENT
Start: 2019-02-19 | End: 2019-02-19 | Stop reason: SDUPTHER

## 2019-02-19 RX ORDER — MORPHINE SULFATE 4 MG/ML
2 INJECTION, SOLUTION INTRAMUSCULAR; INTRAVENOUS EVERY 5 MIN PRN
Status: DISCONTINUED | OUTPATIENT
Start: 2019-02-19 | End: 2019-02-19 | Stop reason: HOSPADM

## 2019-02-19 RX ORDER — SODIUM CHLORIDE 0.9 % (FLUSH) 0.9 %
10 SYRINGE (ML) INJECTION EVERY 12 HOURS SCHEDULED
Status: DISCONTINUED | OUTPATIENT
Start: 2019-02-19 | End: 2019-02-19 | Stop reason: HOSPADM

## 2019-02-19 RX ADMIN — LIDOCAINE HYDROCHLORIDE 40 MG: 20 INJECTION, SOLUTION INFILTRATION; PERINEURAL at 10:07

## 2019-02-19 RX ADMIN — EPHEDRINE SULFATE 10 MG: 50 INJECTION INTRAVENOUS at 10:18

## 2019-02-19 RX ADMIN — FENTANYL CITRATE 25 MCG: 50 INJECTION INTRAMUSCULAR; INTRAVENOUS at 11:04

## 2019-02-19 RX ADMIN — FENTANYL CITRATE 50 MCG: 50 INJECTION INTRAMUSCULAR; INTRAVENOUS at 10:40

## 2019-02-19 RX ADMIN — Medication 2 G: at 10:11

## 2019-02-19 RX ADMIN — ONDANSETRON 4 MG: 2 INJECTION INTRAMUSCULAR; INTRAVENOUS at 10:07

## 2019-02-19 RX ADMIN — PHENYLEPHRINE HYDROCHLORIDE 100 MCG: 10 INJECTION INTRAVENOUS at 10:36

## 2019-02-19 RX ADMIN — SUGAMMADEX 100 MG: 100 INJECTION, SOLUTION INTRAVENOUS at 11:24

## 2019-02-19 RX ADMIN — FENTANYL CITRATE 25 MCG: 50 INJECTION INTRAMUSCULAR; INTRAVENOUS at 11:26

## 2019-02-19 RX ADMIN — PROPOFOL 120 MG: 10 INJECTION, EMULSION INTRAVENOUS at 10:07

## 2019-02-19 RX ADMIN — FENTANYL CITRATE 25 MCG: 50 INJECTION INTRAMUSCULAR; INTRAVENOUS at 10:59

## 2019-02-19 RX ADMIN — SODIUM CHLORIDE, POTASSIUM CHLORIDE, SODIUM LACTATE AND CALCIUM CHLORIDE: 600; 310; 30; 20 INJECTION, SOLUTION INTRAVENOUS at 07:45

## 2019-02-19 RX ADMIN — DEXAMETHASONE SODIUM PHOSPHATE 4 MG: 4 INJECTION, SOLUTION INTRAMUSCULAR; INTRAVENOUS at 10:07

## 2019-02-19 RX ADMIN — FENTANYL CITRATE 50 MCG: 50 INJECTION INTRAMUSCULAR; INTRAVENOUS at 10:07

## 2019-02-19 RX ADMIN — OXYCODONE AND ACETAMINOPHEN 1 TABLET: 5; 325 TABLET ORAL at 12:34

## 2019-02-19 RX ADMIN — ROCURONIUM BROMIDE 40 MG: 10 SOLUTION INTRAVENOUS at 10:07

## 2019-02-19 RX ADMIN — FENTANYL CITRATE 25 MCG: 50 INJECTION INTRAMUSCULAR; INTRAVENOUS at 11:21

## 2019-02-19 ASSESSMENT — PULMONARY FUNCTION TESTS
PIF_VALUE: 31
PIF_VALUE: 18
PIF_VALUE: 0
PIF_VALUE: 32
PIF_VALUE: 23
PIF_VALUE: 31
PIF_VALUE: 2
PIF_VALUE: 31
PIF_VALUE: 21
PIF_VALUE: 18
PIF_VALUE: 29
PIF_VALUE: 19
PIF_VALUE: 2
PIF_VALUE: 32
PIF_VALUE: 21
PIF_VALUE: 30
PIF_VALUE: 21
PIF_VALUE: 20
PIF_VALUE: 20
PIF_VALUE: 32
PIF_VALUE: 31
PIF_VALUE: 29
PIF_VALUE: 32
PIF_VALUE: 30
PIF_VALUE: 32
PIF_VALUE: 17
PIF_VALUE: 7
PIF_VALUE: 29
PIF_VALUE: 29
PIF_VALUE: 19
PIF_VALUE: 18
PIF_VALUE: 33
PIF_VALUE: 18
PIF_VALUE: 32
PIF_VALUE: 18
PIF_VALUE: 13
PIF_VALUE: 29
PIF_VALUE: 18
PIF_VALUE: 15
PIF_VALUE: 31
PIF_VALUE: 31
PIF_VALUE: 29
PIF_VALUE: 31
PIF_VALUE: 19
PIF_VALUE: 2
PIF_VALUE: 32
PIF_VALUE: 26
PIF_VALUE: 19
PIF_VALUE: 19
PIF_VALUE: 3
PIF_VALUE: 18
PIF_VALUE: 0
PIF_VALUE: 11
PIF_VALUE: 32
PIF_VALUE: 31
PIF_VALUE: 18
PIF_VALUE: 13
PIF_VALUE: 1
PIF_VALUE: 31
PIF_VALUE: 18
PIF_VALUE: 15
PIF_VALUE: 19
PIF_VALUE: 0
PIF_VALUE: 2
PIF_VALUE: 4
PIF_VALUE: 4
PIF_VALUE: 2
PIF_VALUE: 17
PIF_VALUE: 18
PIF_VALUE: 0
PIF_VALUE: 31
PIF_VALUE: 33
PIF_VALUE: 32
PIF_VALUE: 4
PIF_VALUE: 31
PIF_VALUE: 18
PIF_VALUE: 2
PIF_VALUE: 0
PIF_VALUE: 15
PIF_VALUE: 22
PIF_VALUE: 31
PIF_VALUE: 4
PIF_VALUE: 17
PIF_VALUE: 31
PIF_VALUE: 31
PIF_VALUE: 18
PIF_VALUE: 31
PIF_VALUE: 0
PIF_VALUE: 18
PIF_VALUE: 18
PIF_VALUE: 32
PIF_VALUE: 1
PIF_VALUE: 15
PIF_VALUE: 18

## 2019-02-19 ASSESSMENT — PAIN DESCRIPTION - LOCATION
LOCATION: ABDOMEN
LOCATION: ABDOMEN

## 2019-02-19 ASSESSMENT — PAIN SCALES - GENERAL
PAINLEVEL_OUTOF10: 4
PAINLEVEL_OUTOF10: 2
PAINLEVEL_OUTOF10: 4
PAINLEVEL_OUTOF10: 4

## 2019-02-19 ASSESSMENT — PAIN DESCRIPTION - ORIENTATION
ORIENTATION: RIGHT
ORIENTATION: RIGHT;MID

## 2019-02-19 ASSESSMENT — PAIN - FUNCTIONAL ASSESSMENT: PAIN_FUNCTIONAL_ASSESSMENT: 0-10

## 2019-02-19 ASSESSMENT — PAIN DESCRIPTION - DESCRIPTORS
DESCRIPTORS: PRESSURE
DESCRIPTORS: OTHER (COMMENT)

## 2019-02-19 ASSESSMENT — PAIN DESCRIPTION - PAIN TYPE
TYPE: SURGICAL PAIN
TYPE: SURGICAL PAIN

## 2019-03-04 ENCOUNTER — OFFICE VISIT (OUTPATIENT)
Dept: SURGERY | Age: 84
End: 2019-03-04

## 2019-03-04 VITALS
WEIGHT: 113.8 LBS | HEART RATE: 57 BPM | BODY MASS INDEX: 20.94 KG/M2 | SYSTOLIC BLOOD PRESSURE: 131 MMHG | DIASTOLIC BLOOD PRESSURE: 62 MMHG | HEIGHT: 62 IN

## 2019-03-04 DIAGNOSIS — Z90.49 S/P LAPAROSCOPIC CHOLECYSTECTOMY: Primary | ICD-10-CM

## 2019-03-04 PROCEDURE — 99024 POSTOP FOLLOW-UP VISIT: CPT | Performed by: SURGERY

## 2022-02-19 ENCOUNTER — APPOINTMENT (OUTPATIENT)
Dept: CT IMAGING | Age: 87
End: 2022-02-19
Payer: MEDICARE

## 2022-02-19 ENCOUNTER — HOSPITAL ENCOUNTER (EMERGENCY)
Age: 87
Discharge: HOME | End: 2022-02-19
Payer: MEDICARE

## 2022-02-19 VITALS
SYSTOLIC BLOOD PRESSURE: 153 MMHG | TEMPERATURE: 97.6 F | OXYGEN SATURATION: 96 % | RESPIRATION RATE: 18 BRPM | HEART RATE: 53 BPM | DIASTOLIC BLOOD PRESSURE: 64 MMHG

## 2022-02-19 DIAGNOSIS — S16.1XXA STRAIN OF NECK MUSCLE, INITIAL ENCOUNTER: Primary | ICD-10-CM

## 2022-02-19 PROCEDURE — 99284 EMERGENCY DEPT VISIT MOD MDM: CPT

## 2022-02-19 PROCEDURE — 6370000000 HC RX 637 (ALT 250 FOR IP): Performed by: PHYSICIAN ASSISTANT

## 2022-02-19 PROCEDURE — 72125 CT NECK SPINE W/O DYE: CPT

## 2022-02-19 PROCEDURE — 6360000002 HC RX W HCPCS: Performed by: PHYSICIAN ASSISTANT

## 2022-02-19 PROCEDURE — 96372 THER/PROPH/DIAG INJ SC/IM: CPT

## 2022-02-19 RX ORDER — HYDROCODONE BITARTRATE AND ACETAMINOPHEN 5; 325 MG/1; MG/1
1 TABLET ORAL EVERY 6 HOURS PRN
Qty: 10 TABLET | Refills: 0 | Status: SHIPPED | OUTPATIENT
Start: 2022-02-19 | End: 2022-02-22

## 2022-02-19 RX ORDER — METHOCARBAMOL 750 MG/1
750 TABLET, FILM COATED ORAL 4 TIMES DAILY
Qty: 40 TABLET | Refills: 0 | Status: SHIPPED | OUTPATIENT
Start: 2022-02-19 | End: 2022-03-01

## 2022-02-19 RX ORDER — KETOROLAC TROMETHAMINE 30 MG/ML
15 INJECTION, SOLUTION INTRAMUSCULAR; INTRAVENOUS ONCE
Status: COMPLETED | OUTPATIENT
Start: 2022-02-19 | End: 2022-02-19

## 2022-02-19 RX ORDER — HYDROCODONE BITARTRATE AND ACETAMINOPHEN 5; 325 MG/1; MG/1
1 TABLET ORAL ONCE
Status: COMPLETED | OUTPATIENT
Start: 2022-02-19 | End: 2022-02-19

## 2022-02-19 RX ORDER — LIDOCAINE 4 G/G
1 PATCH TOPICAL DAILY
Status: DISCONTINUED | OUTPATIENT
Start: 2022-02-19 | End: 2022-02-19 | Stop reason: HOSPADM

## 2022-02-19 RX ORDER — LIDOCAINE 50 MG/G
1 PATCH TOPICAL DAILY
Qty: 30 PATCH | Refills: 0 | Status: SHIPPED | OUTPATIENT
Start: 2022-02-19

## 2022-02-19 RX ORDER — LIDOCAINE 50 MG/G
1 PATCH TOPICAL DAILY
Status: DISCONTINUED | OUTPATIENT
Start: 2022-02-19 | End: 2022-02-19 | Stop reason: CLARIF

## 2022-02-19 RX ORDER — METHOCARBAMOL 500 MG/1
500 TABLET, FILM COATED ORAL ONCE
Status: COMPLETED | OUTPATIENT
Start: 2022-02-19 | End: 2022-02-19

## 2022-02-19 RX ORDER — NAPROXEN 250 MG/1
250 TABLET ORAL 2 TIMES DAILY
Qty: 10 TABLET | Refills: 0 | Status: SHIPPED | OUTPATIENT
Start: 2022-02-19

## 2022-02-19 RX ADMIN — HYDROCODONE BITARTRATE AND ACETAMINOPHEN 1 TABLET: 5; 325 TABLET ORAL at 11:22

## 2022-02-19 RX ADMIN — METHOCARBAMOL 500 MG: 500 TABLET ORAL at 12:58

## 2022-02-19 RX ADMIN — KETOROLAC TROMETHAMINE 15 MG: 30 INJECTION, SOLUTION INTRAMUSCULAR at 12:58

## 2022-02-19 ASSESSMENT — PAIN SCALES - GENERAL
PAINLEVEL_OUTOF10: 9
PAINLEVEL_OUTOF10: 8

## 2022-02-19 ASSESSMENT — PAIN DESCRIPTION - LOCATION: LOCATION: NECK

## 2022-02-19 ASSESSMENT — PAIN DESCRIPTION - ORIENTATION: ORIENTATION: LEFT;PROXIMAL

## 2022-02-19 ASSESSMENT — PAIN DESCRIPTION - PAIN TYPE: TYPE: ACUTE PAIN

## 2022-02-19 NOTE — ED PROVIDER NOTES
[facility] Emergency Department    CHIEF COMPLAINT  Neck Pain (pt woke at 5 am with lleft sided neck pain, pt denies injury or falls. pt does have to help her  at times due to resent illness. pt denies numbness in arms/legs)      SHARED SERVICE VISIT  Evaluated by CHRIS. My supervising physician was available for consultation. HISTORY OF PRESENT ILLNESS  Jim Bolden is a 82 y.o. female who presents to the ED complaining of several hour history of neck pain. Patient dropped off by son and  today for evaluation. Patient reports waking up with some neck pain which is appeared worse with movement. She attempted to Tylenol without improvement of symptoms. Pain does not appear to radiate. Constant. Currently rated at a 8 out of 10. No arm or jaw pain. No chest pain shortness of breath. She denies headaches, lightheadedness, dizziness or confusion. No nausea or vomiting. No loss of bowel or bladder function. No saddle anesthesia. Denies fevers chills. No other complaints, modifying factors or associated symptoms. Nursing notes reviewed.    Past Medical History:   Diagnosis Date    Cancer [facility])     breast    Hyperlipidemia     Hypertension     Trigeminal neuralgia     Trigeminal neuralgia 7/19/2018     Past Surgical History:   Procedure Laterality Date    CHOLECYSTECTOMY, LAPAROSCOPIC N/A 2/19/2019    LAPAROSCOPIC CHOLECYSTECTOMY WITH INTRAOPERATIVE CHOLANGIOGRAM performed by Ted Godfrey MD at [facility] ERCP N/A 2/4/2019    ERCP ENDOSCOPIC RETROGRADE CHOLANGIOPANCREATOGRAPHY performed by Cherelle Giordano MD at [facility] ERCP  2/4/2019    ERCP SPHINCTER/PAPILLOTOMY performed by Cherelle Giordano MD at [facility] ERCP  2/4/2019    ERCP DILATION BALLOON performed by Cherelle Giordano MD at [address] Left     1976     Family History   Problem Relation Age of Onset   [facility] Cancer Mother         unsure     Social History Socioeconomic History    Marital status:      Spouse name: Not on file    Number of children: Not on file    Years of education: Not on file    Highest education level: Not on file   Occupational History    Not on file   Tobacco Use    Smoking status: Never Smoker    Smokeless tobacco: Never Used   Vaping Use    Vaping Use: Never used   Substance and Sexual Activity    Alcohol use: No    Drug use: No    Sexual activity: Not on file   Other Topics Concern    Not on file   Social History Narrative    Not on file     Social Determinants of Health     Financial Resource Strain:     Difficulty of Paying Living Expenses: Not on file   Food Insecurity:     Worried About Running Out of Food in the Last Year: Not on file    [place]a of Food in the Last Year: Not on file   Transportation Needs:     Lack of Transportation (Medical): Not on file    Lack of Transportation (Non-Medical): Not on file   Physical Activity:     Days of Exercise per Week: Not on file    Minutes of Exercise per Session: Not on file   Stress:     Feeling of Stress : Not on file   Social Connections:     Frequency of Communication with Friends and Family: Not on file    Frequency of Social Gatherings with Friends and Family: Not on file    Attends Spiritism Services: Not on file    Active Member of [address] [place] [address] [employer] or Organizations: Not on file    Attends Club or Organization Meetings: Not on file    Marital Status: Not on file   Intimate Partner Violence:     Fear of Current or Ex-Partner: Not on file    Emotionally Abused: Not on file    Physically Abused: Not on file    Sexually Abused: Not on file   Housing Stability:     Unable to Pay for Housing in the Last Year: Not on file    Number of [place] in the Last Year: Not on file    Unstable Housing in the Last Year: Not on file     No current facility-administered medications for this encounter.      Current Outpatient Medications   Medication Sig Dispense Refill  acetaminophen (TYLENOL) 500 MG tablet Take 500 mg by mouth every 6 hours as needed for Pain      gabapentin (NEURONTIN) 300 MG capsule Take 200 mg by mouth 2 times daily. Arloa Helen atenolol (TENORMIN) 50 MG tablet Take 50 mg by mouth daily. Allergies   Allergen Reactions    Erythromycin      cough       REVIEW OF SYSTEMS  10 systems reviewed, pertinent positives per HPI otherwise noted to be negative    PHYSICAL EXAM  BP (!) 153/72   Pulse 55   Temp 97.7 °F (36.5 °C) (Oral)   Resp 18   GENERAL APPEARANCE: Awake and alert. Cooperative. No acute distress. HEAD: Normocephalic. Atraumatic. EYES: PERRL. EOM's grossly intact. ENT: Mucous membranes are moist.   NECK: Supple. Patient without midline bony tenderness. No deformities or step-offs. No evidence of dislocation subluxation. Tenderness of paraspinal musculature to left of midline. No swelling, bruising, or color change. HEART: RRR. No murmurs. LUNGS: Respirations unlabored. CTAB. Good air exchange. Speaking comfortably in full sentences. ABDOMEN: Soft. Non-distended. Non-tender. No guarding or rebound. No midline pulsatile mass. EXTREMITIES: No peripheral edema. No unilateral calf pain, redness or swelling. Moves all extremities equally. All extremities neurovascularly intact. Biceps and patellar DTRs +2 bilaterally. SKIN: Warm and dry. No acute rashes. NEUROLOGICAL: Alert and oriented. CN's 2-12 intact. No gross facial drooping. Strength 5/5, sensation intact. PSYCHIATRIC: Normal mood and affect. RADIOLOGY  CT CERVICAL SPINE WO CONTRAST    Result Date: 2/19/2022  EXAMINATION: CT OF THE CERVICAL SPINE WITHOUT CONTRAST 2/19/2022 11:36 am TECHNIQUE: CT of the cervical spine was performed without the administration of intravenous contrast. Multiplanar reformatted images are provided for review.  Dose modulation, iterative reconstruction, and/or weight based adjustment of the mA/kV was utilized to reduce the radiation dose to as low as reasonably achievable. COMPARISON: None. HISTORY: ORDERING SYSTEM PROVIDED HISTORY: pain TECHNOLOGIST PROVIDED HISTORY: Reason for exam:->pain Decision Support Exception - unselect if not a suspected or confirmed emergency medical condition->Emergency Medical Condition (MA) Reason for Exam: pt states a horrible pain in the back of her neck on the LT side. NKI Relevant Medical/Surgical History: HX breast CA, HTN,tri-geminal neuralgia FINDINGS: BONES/ALIGNMENT: There is no acute fracture or traumatic malalignment. There is approximately 2-3 mm degenerative anterolisthesis of C3 on C4. DEGENERATIVE CHANGES: There is moderate to severe disc space narrowing and endplate osteophyte formation from the C3/4 down through the C7/T1 levels. Uncinate spurring and facet arthropathy contribute to severe right neural foraminal encroachment at C3/4. SOFT TISSUES: There is no prevertebral soft tissue swelling. No acute abnormality of the cervical spine. ED COURSE  Patient received Norco for pain, with good relief. Triage vitals stable. CT cervical spine was negative for acute osseous injuries or dislocations. Patient with ongoing pain otherwise. Given dose of Toradol, Lidoderm and Robaxin with symptomatic improvement of symptoms. Do feel that this is musculoskeletal.  Will be discharged home with medications for symptomatic treatment. I discussed return precautions and recommendations for follow-up otherwise and patient in agreement and comfortable at discharge. A discussion was had with Mrs. Marcus Zavala regarding neck pain, ED findings and recommendations for follow-up. Risk management discussed and shared decision making had with patient and/or surrogate. All questions were answered. Patient will follow up with PCP in 2 to 3 days as needed for further evaluation/treatment. All questions answered. Patient will return to ED for new/worsening symptoms.     Patient was sent home with a prescription for Norco, Lidoderm, naproxen and Robaxin. MDM  I estimate there is LOW risk for CAUDA EQUINA or CENTRAL CORD SYNDROME, EPIDURAL MASS LESION, MENINGITIS, SPINAL STENOSIS, OR HERNIATED DISK CAUSING SEVERE STENOSIS, thus I consider the discharge disposition reasonable. Celina Murray and I have discussed the diagnosis and risks, and we agree with discharging home to follow-up with their primary doctor. We also discussed returning to the Emergency Department immediately if new or worsening symptoms occur. We have discussed the symptoms which are most concerning (e.g., saddle anesthesia, urinary or bowel incontinence or retention, changing or worsening pain) that necessitate immediate return. FInal Impression  1. Strain of neck muscle, initial encounter      Blood pressure (!) 153/64, pulse 53, temperature 97.6 °F (36.4 °C), temperature source Oral, resp. rate 18, SpO2 96 %, not currently breastfeeding. DISPOSITION  Patient was discharged to home in good condition.           Killian Cotter [place]  02/19/22 [id number]

## 2022-02-19 NOTE — ED NOTES
Patient medicated with Norco. Patient provided with peanut butter crackers and water to sip to prevent any stomach discomfort.  at patient bedside and also provided with peanut butter crackers and orange juice. Patient updated on plan of care and verbalized understanding.       Mary Suggs RN  02/19/22 [id number]

## 2022-09-04 ENCOUNTER — APPOINTMENT (OUTPATIENT)
Dept: CT IMAGING | Age: 87
DRG: 602 | End: 2022-09-04
Payer: MEDICARE

## 2022-09-04 ENCOUNTER — APPOINTMENT (OUTPATIENT)
Dept: GENERAL RADIOLOGY | Age: 87
DRG: 602 | End: 2022-09-04
Payer: MEDICARE

## 2022-09-04 ENCOUNTER — HOSPITAL ENCOUNTER (INPATIENT)
Age: 87
LOS: 2 days | Discharge: HOME HEALTH | DRG: 602 | End: 2022-09-06
Attending: EMERGENCY MEDICINE | Admitting: HOSPITALIST
Payer: MEDICARE

## 2022-09-04 DIAGNOSIS — R41.82 ALTERED MENTAL STATUS, UNSPECIFIED ALTERED MENTAL STATUS TYPE: Primary | ICD-10-CM

## 2022-09-04 PROBLEM — I16.0 HYPERTENSIVE URGENCY, MALIGNANT: Status: ACTIVE | Noted: 2022-09-04

## 2022-09-04 PROBLEM — R73.9 BORDERLINE HYPERGLYCEMIA: Status: ACTIVE | Noted: 2022-09-04

## 2022-09-04 PROBLEM — R45.1 AGITATION: Status: ACTIVE | Noted: 2022-09-04

## 2022-09-04 PROBLEM — N39.0 URINARY TRACT INFECTION WITHOUT HEMATURIA: Status: ACTIVE | Noted: 2022-09-04

## 2022-09-04 PROBLEM — G93.40 ACUTE ENCEPHALOPATHY: Status: ACTIVE | Noted: 2022-09-04

## 2022-09-04 LAB
A/G RATIO: 1 (ref 1.1–2.2)
ACETAMINOPHEN LEVEL: <5 UG/ML (ref 10–30)
ALBUMIN SERPL-MCNC: 4.3 G/DL (ref 3.4–5)
ALP BLD-CCNC: 129 U/L (ref 40–129)
ALT SERPL-CCNC: 11 U/L (ref 10–40)
AMMONIA: 26 UMOL/L (ref 11–51)
AMORPHOUS: ABNORMAL /HPF
AMPHETAMINE SCREEN, URINE: NORMAL
ANION GAP SERPL CALCULATED.3IONS-SCNC: 10 MMOL/L (ref 3–16)
APTT: 27.6 SEC (ref 23–34.3)
AST SERPL-CCNC: 22 U/L (ref 15–37)
BACTERIA: ABNORMAL /HPF
BARBITURATE SCREEN URINE: NORMAL
BASOPHILS ABSOLUTE: 0.1 K/UL (ref 0–0.2)
BASOPHILS RELATIVE PERCENT: 0.8 %
BENZODIAZEPINE SCREEN, URINE: NORMAL
BILIRUB SERPL-MCNC: 1.1 MG/DL (ref 0–1)
BILIRUBIN DIRECT: <0.2 MG/DL (ref 0–0.3)
BILIRUBIN URINE: NEGATIVE
BILIRUBIN, INDIRECT: NORMAL MG/DL (ref 0–1)
BLOOD, URINE: NEGATIVE
BUN BLDV-MCNC: 15 MG/DL (ref 7–20)
CALCIUM SERPL-MCNC: 9.7 MG/DL (ref 8.3–10.6)
CANNABINOID SCREEN URINE: NORMAL
CHLORIDE BLD-SCNC: 95 MMOL/L (ref 99–110)
CLARITY: CLEAR
CO2: 29 MMOL/L (ref 21–32)
COARSE CASTS, UA: ABNORMAL /LPF (ref 0–2)
COCAINE METABOLITE SCREEN URINE: NORMAL
COLOR: YELLOW
CREAT SERPL-MCNC: 0.8 MG/DL (ref 0.6–1.2)
EOSINOPHILS ABSOLUTE: 0.1 K/UL (ref 0–0.6)
EOSINOPHILS RELATIVE PERCENT: 0.8 %
EPITHELIAL CELLS, UA: ABNORMAL /HPF (ref 0–5)
ETHANOL: NORMAL MG/DL (ref 0–0.08)
FENTANYL SCREEN, URINE: NORMAL
GFR AFRICAN AMERICAN: >60
GFR NON-AFRICAN AMERICAN: >60
GLUCOSE BLD-MCNC: 132 MG/DL (ref 70–99)
GLUCOSE BLD-MCNC: 149 MG/DL (ref 70–99)
GLUCOSE URINE: NEGATIVE MG/DL
HCT VFR BLD CALC: 42.8 % (ref 36–48)
HEMOGLOBIN: 14.4 G/DL (ref 12–16)
INR BLD: 1.02 (ref 0.87–1.14)
INR BLD: 1.12 (ref 0.87–1.14)
KETONES, URINE: NEGATIVE MG/DL
LACTIC ACID: 1.3 MMOL/L (ref 0.4–2)
LACTIC ACID: 1.4 MMOL/L (ref 0.4–2)
LEUKOCYTE ESTERASE, URINE: NEGATIVE
LYMPHOCYTES ABSOLUTE: 2.6 K/UL (ref 1–5.1)
LYMPHOCYTES RELATIVE PERCENT: 28.5 %
Lab: NORMAL
MCH RBC QN AUTO: 31.6 PG (ref 26–34)
MCHC RBC AUTO-ENTMCNC: 33.7 G/DL (ref 31–36)
MCV RBC AUTO: 93.7 FL (ref 80–100)
METHADONE SCREEN, URINE: NORMAL
MICROSCOPIC EXAMINATION: YES
MONOCYTES ABSOLUTE: 0.9 K/UL (ref 0–1.3)
MONOCYTES RELATIVE PERCENT: 9.3 %
NEUTROPHILS ABSOLUTE: 5.6 K/UL (ref 1.7–7.7)
NEUTROPHILS RELATIVE PERCENT: 60.6 %
NITRITE, URINE: NEGATIVE
OPIATE SCREEN URINE: NORMAL
OXYCODONE URINE: NORMAL
PDW BLD-RTO: 12.4 % (ref 12.4–15.4)
PERFORMED ON: ABNORMAL
PH UA: 7
PH UA: 7 (ref 5–8)
PHENCYCLIDINE SCREEN URINE: NORMAL
PLATELET # BLD: 301 K/UL (ref 135–450)
PMV BLD AUTO: 6.7 FL (ref 5–10.5)
POTASSIUM SERPL-SCNC: 3.4 MMOL/L (ref 3.5–5.1)
PRO-BNP: 498 PG/ML (ref 0–449)
PROCALCITONIN: 0.16 NG/ML (ref 0–0.15)
PROTEIN UA: 100 MG/DL
PROTHROMBIN TIME: 13.2 SEC (ref 11.7–14.5)
PROTHROMBIN TIME: 14.2 SEC (ref 11.7–14.5)
RBC # BLD: 4.57 M/UL (ref 4–5.2)
RBC UA: ABNORMAL /HPF (ref 0–4)
SALICYLATE, SERUM: <0.3 MG/DL (ref 15–30)
SODIUM BLD-SCNC: 134 MMOL/L (ref 136–145)
SPECIFIC GRAVITY UA: 1.01 (ref 1–1.03)
SPECIMEN STATUS: NORMAL
TOTAL PROTEIN: 8.6 G/DL (ref 6.4–8.2)
TROPONIN: <0.01 NG/ML
URINE REFLEX TO CULTURE: ABNORMAL
URINE TYPE: ABNORMAL
UROBILINOGEN, URINE: 1 E.U./DL
WBC # BLD: 9.3 K/UL (ref 4–11)
WBC UA: ABNORMAL /HPF (ref 0–5)

## 2022-09-04 PROCEDURE — 85025 COMPLETE CBC W/AUTO DIFF WBC: CPT

## 2022-09-04 PROCEDURE — 84134 ASSAY OF PREALBUMIN: CPT

## 2022-09-04 PROCEDURE — 83880 ASSAY OF NATRIURETIC PEPTIDE: CPT

## 2022-09-04 PROCEDURE — 84145 PROCALCITONIN (PCT): CPT

## 2022-09-04 PROCEDURE — 85730 THROMBOPLASTIN TIME PARTIAL: CPT

## 2022-09-04 PROCEDURE — 83605 ASSAY OF LACTIC ACID: CPT

## 2022-09-04 PROCEDURE — 6360000002 HC RX W HCPCS: Performed by: HOSPITALIST

## 2022-09-04 PROCEDURE — 82077 ASSAY SPEC XCP UR&BREATH IA: CPT

## 2022-09-04 PROCEDURE — 81001 URINALYSIS AUTO W/SCOPE: CPT

## 2022-09-04 PROCEDURE — 82248 BILIRUBIN DIRECT: CPT

## 2022-09-04 PROCEDURE — 99285 EMERGENCY DEPT VISIT HI MDM: CPT

## 2022-09-04 PROCEDURE — 87070 CULTURE OTHR SPECIMN AEROBIC: CPT

## 2022-09-04 PROCEDURE — 84484 ASSAY OF TROPONIN QUANT: CPT

## 2022-09-04 PROCEDURE — 87205 SMEAR GRAM STAIN: CPT

## 2022-09-04 PROCEDURE — 84443 ASSAY THYROID STIM HORMONE: CPT

## 2022-09-04 PROCEDURE — 82607 VITAMIN B-12: CPT

## 2022-09-04 PROCEDURE — 2580000003 HC RX 258: Performed by: HOSPITALIST

## 2022-09-04 PROCEDURE — 80053 COMPREHEN METABOLIC PANEL: CPT

## 2022-09-04 PROCEDURE — 36415 COLL VENOUS BLD VENIPUNCTURE: CPT

## 2022-09-04 PROCEDURE — 87077 CULTURE AEROBIC IDENTIFY: CPT

## 2022-09-04 PROCEDURE — 82140 ASSAY OF AMMONIA: CPT

## 2022-09-04 PROCEDURE — 80307 DRUG TEST PRSMV CHEM ANLYZR: CPT

## 2022-09-04 PROCEDURE — 71045 X-RAY EXAM CHEST 1 VIEW: CPT

## 2022-09-04 PROCEDURE — 93005 ELECTROCARDIOGRAM TRACING: CPT | Performed by: NURSE PRACTITIONER

## 2022-09-04 PROCEDURE — 82306 VITAMIN D 25 HYDROXY: CPT

## 2022-09-04 PROCEDURE — 80179 DRUG ASSAY SALICYLATE: CPT

## 2022-09-04 PROCEDURE — 1200000000 HC SEMI PRIVATE

## 2022-09-04 PROCEDURE — 85610 PROTHROMBIN TIME: CPT

## 2022-09-04 PROCEDURE — 80143 DRUG ASSAY ACETAMINOPHEN: CPT

## 2022-09-04 PROCEDURE — 82746 ASSAY OF FOLIC ACID SERUM: CPT

## 2022-09-04 PROCEDURE — 87086 URINE CULTURE/COLONY COUNT: CPT

## 2022-09-04 PROCEDURE — 6370000000 HC RX 637 (ALT 250 FOR IP): Performed by: HOSPITALIST

## 2022-09-04 PROCEDURE — 2580000003 HC RX 258: Performed by: NURSE PRACTITIONER

## 2022-09-04 PROCEDURE — 70450 CT HEAD/BRAIN W/O DYE: CPT

## 2022-09-04 PROCEDURE — 84439 ASSAY OF FREE THYROXINE: CPT

## 2022-09-04 RX ORDER — ACETAMINOPHEN 650 MG/1
650 SUPPOSITORY RECTAL EVERY 4 HOURS PRN
Status: DISCONTINUED | OUTPATIENT
Start: 2022-09-04 | End: 2022-09-06 | Stop reason: HOSPADM

## 2022-09-04 RX ORDER — ASPIRIN 81 MG/1
81 TABLET ORAL DAILY
Status: DISCONTINUED | OUTPATIENT
Start: 2022-09-04 | End: 2022-09-06 | Stop reason: HOSPADM

## 2022-09-04 RX ORDER — ATENOLOL 25 MG/1
50 TABLET ORAL DAILY
Status: DISCONTINUED | OUTPATIENT
Start: 2022-09-04 | End: 2022-09-06 | Stop reason: HOSPADM

## 2022-09-04 RX ORDER — HALOPERIDOL 5 MG/ML
2 INJECTION INTRAMUSCULAR EVERY 6 HOURS PRN
Status: DISCONTINUED | OUTPATIENT
Start: 2022-09-04 | End: 2022-09-06 | Stop reason: HOSPADM

## 2022-09-04 RX ORDER — 0.9 % SODIUM CHLORIDE 0.9 %
500 INTRAVENOUS SOLUTION INTRAVENOUS ONCE
Status: COMPLETED | OUTPATIENT
Start: 2022-09-04 | End: 2022-09-04

## 2022-09-04 RX ORDER — SENNA PLUS 8.6 MG/1
1 TABLET ORAL DAILY PRN
Status: DISCONTINUED | OUTPATIENT
Start: 2022-09-04 | End: 2022-09-06 | Stop reason: HOSPADM

## 2022-09-04 RX ORDER — SODIUM CHLORIDE 0.9 % (FLUSH) 0.9 %
10 SYRINGE (ML) INJECTION EVERY 12 HOURS SCHEDULED
Status: DISCONTINUED | OUTPATIENT
Start: 2022-09-04 | End: 2022-09-06 | Stop reason: HOSPADM

## 2022-09-04 RX ORDER — BISACODYL 10 MG
10 SUPPOSITORY, RECTAL RECTAL DAILY PRN
Status: DISCONTINUED | OUTPATIENT
Start: 2022-09-04 | End: 2022-09-06 | Stop reason: HOSPADM

## 2022-09-04 RX ORDER — ACETAMINOPHEN 325 MG/1
650 TABLET ORAL EVERY 4 HOURS PRN
Status: DISCONTINUED | OUTPATIENT
Start: 2022-09-04 | End: 2022-09-06 | Stop reason: HOSPADM

## 2022-09-04 RX ORDER — ASPIRIN 300 MG/1
300 SUPPOSITORY RECTAL DAILY
Status: DISCONTINUED | OUTPATIENT
Start: 2022-09-04 | End: 2022-09-06 | Stop reason: HOSPADM

## 2022-09-04 RX ORDER — LIDOCAINE 4 G/G
1 PATCH TOPICAL DAILY
Refills: 0 | Status: DISCONTINUED | OUTPATIENT
Start: 2022-09-04 | End: 2022-09-06 | Stop reason: HOSPADM

## 2022-09-04 RX ORDER — GABAPENTIN 100 MG/1
200 CAPSULE ORAL 2 TIMES DAILY
Status: DISCONTINUED | OUTPATIENT
Start: 2022-09-04 | End: 2022-09-06 | Stop reason: HOSPADM

## 2022-09-04 RX ORDER — ONDANSETRON 2 MG/ML
4 INJECTION INTRAMUSCULAR; INTRAVENOUS EVERY 6 HOURS PRN
Status: DISCONTINUED | OUTPATIENT
Start: 2022-09-04 | End: 2022-09-06 | Stop reason: HOSPADM

## 2022-09-04 RX ORDER — LABETALOL HYDROCHLORIDE 5 MG/ML
10 INJECTION, SOLUTION INTRAVENOUS EVERY 10 MIN PRN
Status: DISCONTINUED | OUTPATIENT
Start: 2022-09-04 | End: 2022-09-06 | Stop reason: HOSPADM

## 2022-09-04 RX ORDER — SODIUM CHLORIDE 9 MG/ML
INJECTION, SOLUTION INTRAVENOUS PRN
Status: DISCONTINUED | OUTPATIENT
Start: 2022-09-04 | End: 2022-09-06 | Stop reason: HOSPADM

## 2022-09-04 RX ORDER — SODIUM CHLORIDE 0.9 % (FLUSH) 0.9 %
10 SYRINGE (ML) INJECTION PRN
Status: DISCONTINUED | OUTPATIENT
Start: 2022-09-04 | End: 2022-09-06 | Stop reason: HOSPADM

## 2022-09-04 RX ORDER — ENOXAPARIN SODIUM 100 MG/ML
30 INJECTION SUBCUTANEOUS DAILY
Status: DISCONTINUED | OUTPATIENT
Start: 2022-09-05 | End: 2022-09-06 | Stop reason: HOSPADM

## 2022-09-04 RX ORDER — SODIUM CHLORIDE 9 MG/ML
INJECTION, SOLUTION INTRAVENOUS CONTINUOUS
Status: ACTIVE | OUTPATIENT
Start: 2022-09-04 | End: 2022-09-05

## 2022-09-04 RX ADMIN — CEFTRIAXONE SODIUM 1000 MG: 1 INJECTION, POWDER, FOR SOLUTION INTRAMUSCULAR; INTRAVENOUS at 19:15

## 2022-09-04 RX ADMIN — GABAPENTIN 200 MG: 100 CAPSULE ORAL at 20:17

## 2022-09-04 RX ADMIN — ATENOLOL 50 MG: 25 TABLET ORAL at 20:18

## 2022-09-04 RX ADMIN — SODIUM CHLORIDE: 9 INJECTION, SOLUTION INTRAVENOUS at 18:42

## 2022-09-04 RX ADMIN — SODIUM CHLORIDE 500 ML: 9 INJECTION, SOLUTION INTRAVENOUS at 13:35

## 2022-09-04 ASSESSMENT — PAIN - FUNCTIONAL ASSESSMENT: PAIN_FUNCTIONAL_ASSESSMENT: NONE - DENIES PAIN

## 2022-09-04 NOTE — PROGRESS NOTES
Patient admitted and needs routine orders. Remains NPO due to failing swallowing screen. Wound culture for an apparent purulent discharge around umbilicus. Family desires for code status to be DNR.       Dimitrios Mathews MD  [facility]

## 2022-09-04 NOTE — PROGRESS NOTES
Patient admitted to room 217 from ED. Patient oriented to room, call light, bed rails, phone, lights and bathroom. Patient instructed about the schedule of the day including: vital sign frequency, lab draws, possible tests, frequency of MD and staff rounds, including RN/MD rounding together at bedside, daily weights, and I &O's. Patient instructed about prescribed diet,  and television. has bed alarm in place,and a sitter. patient aware of placement and reason. Has Telemetry box  in place, patient aware of placement and reason. Bed locked, in lowest position, side rails up 2/4, call light within reach. Will continue to monitor.

## 2022-09-04 NOTE — PROGRESS NOTES
Paged MD regarding patient having redness, inflammation and  white drainage coming from navel. Patient not sure how long its been like that. No family at bedside. New orders for culture. Culture done and sent to lab.

## 2022-09-04 NOTE — ED PROVIDER NOTES
Emergency Department Attending Provider Note  Location: [address] [place] [address]  ED  9/4/2022     Patient Identification  Corinne Judge is a 82 y.o. female    Past Medical History:   Diagnosis Date    Cancer ([address]     breast    Hyperlipidemia     Hypertension     Trigeminal neuralgia     Trigeminal neuralgia 7/19/2018       Corinne Judge was evaluated in the Emergency Department for headaches and intermittent confusion over the past week. .   describes that patient is having a difficult time figuring out what to say, and he is quite concerned about this as this is new within the past 2 to 3 days, and very unlike patient. He did not know of any recent illnesses, and says this started gradually over the past couple of days. No falls or trauma. Please see further documentation by PA. Although initial history and physical exam information was obtained by CRISTINA Cullen (who also dictated a record of this visit), I personally saw the patient and performed a substantive portion of the visit including all aspects of the medical decision making. CT HEAD WO CONTRAST   Final Result   Atrophy and small vessel ischemic disease. XR CHEST PORTABLE   Final Result   Magnified cardiac silhouette without radiographic evidence of acute pulmonary   abnormality seen.              Labs Reviewed   URINALYSIS WITH REFLEX TO CULTURE - Abnormal; Notable for the following components:       Result Value    Protein,  (*)     All other components within normal limits   COMPREHENSIVE METABOLIC PANEL - Abnormal; Notable for the following components:    Sodium 134 (*)     Potassium 3.4 (*)     Chloride 95 (*)     Glucose 149 (*)     Total Protein 8.6 (*)     Albumin/Globulin Ratio 1.0 (*)     Total Bilirubin 1.1 (*)     All other components within normal limits   ACETAMINOPHEN LEVEL - Abnormal; Notable for the following components:    Acetaminophen Level <5 (*)     All other components within normal limits SALICYLATE LEVEL - Abnormal; Notable for the following components:    Salicylate, Serum <5.0 (*)     All other components within normal limits   MICROSCOPIC URINALYSIS - Abnormal; Notable for the following components:    Bacteria, UA 3+ (*)     All other components within normal limits   PROCALCITONIN - Abnormal; Notable for the following components:    Procalcitonin 0.16 (*)     All other components within normal limits   COMPREHENSIVE METABOLIC PANEL W/ REFLEX TO MG FOR LOW K - Abnormal; Notable for the following components:    Potassium reflex Magnesium 3.2 (*)     Albumin/Globulin Ratio 0.9 (*)     ALT 9 (*)     All other components within normal limits   BRAIN NATRIURETIC PEPTIDE - Abnormal; Notable for the following components:    Pro- (*)     All other components within normal limits   POCT GLUCOSE - Abnormal; Notable for the following components:    POC Glucose 132 (*)     All other components within normal limits   CULTURE, WOUND   CULTURE, URINE   LACTIC ACID   AMMONIA   URINE DRUG SCREEN   CBC WITH AUTO DIFFERENTIAL   PROTIME-INR   TROPONIN   SAMPLE POSSIBLE BLOOD BANK TESTING   ETHANOL   HEPATIC FUNCTION PANEL   LACTIC ACID   CBC WITH AUTO DIFFERENTIAL   PROTIME-INR   APTT   TROPONIN   TROPONIN   MAGNESIUM   VITAMIN B12 & FOLATE   VITAMIN D 25 HYDROXY   TSH   T4, FREE   PREALBUMIN   HEMOGLOBIN A1C   LIPID PANEL         PLAN:   -Patient seen and evaluated. Relevant records reviewed. Laboratory evaluation today is largely unremarkable outside of very minor electrolyte abnormalities. Troponin, CBC, CMP, troponin, ethanol, ammonia, drug screen all reassuring. No clear signs of infection. CT head with small vessel disease, no blood thinners, no intracranial hemorrhage. CTA was not obtained as this has been going on for multiple days, and on my exam, patient has no lateralizing symptomatology. No focal neurological deficits.     Please see further documentation, this patient will be admitted for altered mental status and a headache. Without a fever or nuchal rigidity today in the emergency department, I do not feel it is necessary to perform an emergent lumbar puncture, however then this may be considered. Medications   0.9 % sodium chloride bolus (500 mLs IntraVENous New Bag 9/4/22 1335)       IMPRESSION:    ICD-10-CM    1. Altered mental status, unspecified altered mental status type  R41.82           I, Ary Davies DO am the primary clinician of record. This chart was generated in part by using Dragon Dictation system and may contain errors related to that system including errors in grammar, punctuation, and spelling, as well as words and phrases that may be inappropriate. If there are any questions or concerns please feel free to contact the dictating provider for clarification.      ARY DAVIES DO  [facility] Hospital[facility]  09/05/22 1006

## 2022-09-04 NOTE — ED NOTES
Pt ambulatory to restroom with a steady gait independently. Pt had a bowel movement, no urine.      Dayana Saliva  09/04/22 [id number]

## 2022-09-04 NOTE — H&P
HOSPITALISTS HISTORY AND PHYSICAL    9/4/2022 6:10 PM    Patient Information:  Veda Benton is a 82 y.o. female [id number]  PCP:  LV Abbott CNP (Tel: [phone] )    Chief complaint:    Chief Complaint   Patient presents with    Headache     Patient with intermittent headaches during the last week. Patient denies vision changes, dizziness or lightheadedness. Altered Mental Status     Patient with intermittent difficulty find thoughts or the right words,  states this is new within the last 2-3 days. History of Present Illness:  Corinne Judge is a 82 y.o. female who presented to the ED to be evaluated for a 1 week history of headache, accompanied by acute encephalopathy ongoing for 2 days PTA. Patient has been speaking in word salads but without obvious dysarthria, diplopia, hemiparesis, or vertigo. History is obtained from patient's son who is present at the bedside, as patient is acutely confused and agitated. Patient has no previous history of CVA but does have multiple risk factors including HTN, HLD, type II DM, and prior tobacco use. Upon arrival to the ED EKG was obtained revealing NSR with nonspecific T wave abnormalities with possible anterior ischemia. Stat head CT notable for atrophy, ventricular prominence, and small vessel ischemic changes. CXR with cardiomegaly but no evidence of pulmonary congestion. Notable labs include: Mild hyponatremia 134, hypokalemia 3.4, hyperglycemia 149, negative UDS, and UA with 3+ bacteria. Patient was extremely agitated and somewhat combative in ED thus necessitating administration of IM Haldol x1.     History obtained from patient and review of Epic chart     REVIEW OF SYSTEMS:   Constitutional: Positive generalized weakness  ENT: Positive headache  Respiratory: Negative for shortness of breath, wheezing, and cough  Cardiovascular: Negative for chest pain, palpitations, peripheral edema, orthopnea or PND  Gastrointestinal: Negative for N/V/D and abdominal pain; no hematemesis, hematochezia, or melena; no anorexia  Genitourinary: Negative for dysuria, frequency, retention; no incontinence  Hematologic/Lymphatic: Negative for bleeding tendency/excessive bruising  Musculoskeletal: Negative for myalgias and arthalgias; able to ambulate without difficulty  Neurologic: Negative for LOC, seizure activity, paresthesias, dysarthria, vertigo, and gait disturbance  Skin: Negative for itching,rash, decubitus  Psychiatric: Positive agitation; no hallucinations  Endocrine: Negative for polyuria/polydipsia/polyphagia; no heat/cold intolerance    Past Medical History:   has a past medical history of Cancer ([facility] [address] Hyperlipidemia, Hypertension, Trigeminal neuralgia, and Trigeminal neuralgia. Past Surgical History:   has a past surgical history that includes ERCP (N/A, 2/4/2019); ERCP (2/4/2019); ERCP (2/4/2019); Mastectomy (Left); and Cholecystectomy, laparoscopic (N/A, 2/19/2019). Medications:  No current facility-administered medications on file prior to encounter. Current Outpatient Medications on File Prior to Encounter   Medication Sig Dispense Refill    naproxen (NAPROSYN) 250 MG tablet Take 1 tablet by mouth 2 times daily 10 tablet 0    lidocaine (LIDODERM) 5 % Place 1 patch onto the skin daily 12 hours on, 12 hours off. 30 patch 0    acetaminophen (TYLENOL) 500 MG tablet Take 500 mg by mouth every 6 hours as needed for Pain      gabapentin (NEURONTIN) 300 MG capsule Take 200 mg by mouth 2 times daily. Estil Gandara atenolol (TENORMIN) 50 MG tablet Take 50 mg by mouth daily. Allergies: Allergies   Allergen Reactions    Erythromycin      cough        Social History:   reports that she has never smoked. She has never used smokeless tobacco. She reports that she does not drink alcohol and does not use drugs.      Family History:  family history includes Cancer in her mother.      Physical Exam:  BP (!) 195/91   Pulse 74   Temp 98.4 °F (36.9 °C)   Resp 16   Ht 5' 4\" (1.626 m)   Wt 85 lb 6.4 oz (38.7 kg)   SpO2 100%   BMI 14.66 kg/m²     General appearance:    Eyes: Sclera clear without conjunctival injection; PERRLA; EOMI  ENT: Mucous membranes moist without thrush; normal dentition  Neck: Supple without meningismus; no goiter; no carotid bruit bilaterally  Cardiovascular: Regular rhythm without ectopy; normal S1-S2 with no murmurs; no peripheral edema; no JVD  Respiratory: No tachypnea; CTAB with adequate air exchange, no wheeze, rhonchi or rales; I:E intact  Gastrointestinal: Abdomen soft, non-tender, not distended; bowel sounds normal; no masses/organomegaly appreciated  Musculoskeletal: FROM spine and extremities x4; no gross deformity  Neurology: A&O x3; cranial nerves 2-12 grossly intact; motor 5/5  BUE/BLE; no seizure activity; finger-to-nose/heel-to-shin intact; no pronator drift  Psychiatry: Well-groomed with good eye contact; appropriate affect; no visual/auditory hallucination  Skin: Warm, dry, normal turgor, no rash  PV: 2/4 radial and dorsalis pedis bilaterally; brisk capillary refill    Labs:  CBC:   Lab Results   Component Value Date/Time    WBC 9.3 09/04/2022 01:10 PM    RBC 4.57 09/04/2022 01:10 PM    HGB 14.4 09/04/2022 01:10 PM    HCT 42.8 09/04/2022 01:10 PM    MCV 93.7 09/04/2022 01:10 PM    MCH 31.6 09/04/2022 01:10 PM    MCHC 33.7 09/04/2022 01:10 PM    RDW 12.4 09/04/2022 01:10 PM     09/04/2022 01:10 PM    MPV 6.7 09/04/2022 01:10 PM     BMP:    Lab Results   Component Value Date/Time     09/04/2022 01:10 PM    K 3.4 09/04/2022 01:10 PM    K 3.7 02/02/2019 05:42 AM    CL 95 09/04/2022 01:10 PM    CO2 29 09/04/2022 01:10 PM    BUN 15 09/04/2022 01:10 PM    CREATININE 0.8 09/04/2022 01:10 PM    CALCIUM 9.7 09/04/2022 01:10 PM    GFRAA >60 09/04/2022 01:10 PM    GFRAA 40 02/06/2012 10:43 AM    LABGLOM >60 09/04/2022 01:10 PM    GLUCOSE 149 09/04/2022 01:10 PM    GLUCOSE 102 02/06/2012 10:43 AM     CT HEAD WO CONTRAST   Final Result   Atrophy and small vessel ischemic disease. XR CHEST PORTABLE   Final Result   Magnified cardiac silhouette without radiographic evidence of acute pulmonary   abnormality seen. EKG: Ventricular Rate 65 P BPM QTc Calculation (Bazett) 470 P ms   Atrial Rate 65 P BPM P Axis 70 P degrees   P-R Interval 192 P ms R Axis 74 P degrees   QRS Duration 130 P ms T Axis 12 P degrees   Q-T Interval 452 P ms Diagnosis Normal sinus rhythmNon-specific intra-ventricular conduction blockT wave abnormality, consider anterior ischemia     I visualized CXR images and EKG strips personally and agree with documented interpretation    Discussed case  with ED provider    Problem List:  Principal Problem:    Acute encephalopathy  Active Problems:    Urinary tract infection without hematuria    Hypertensive urgency, malignant    Insulin resistance    HTN (hypertension), benign    Agitation    Borderline hyperglycemia    Vitamin D deficiency  Resolved Problems:    * No resolved hospital problems.  *        Consults:  IP CONSULT TO SOCIAL WORK      Assessment/Plan:     Acute encephalopathy with language deficit  -Admit to telemetry under CVA pathway with Q4H neuro checks scheduled overnight  -Aspiration precautions and fall protocol in place  -Initiate EC ASA 81 mg daily antiplatelet agent as well as high-dose statin therapy; FLP pending  -PRN IV labetalol scheduled for extreme BP elevation; allow permissive HTN initially to ensure watershed blood flow remains intact  -IM Haldol available every 6 hours as needed agitation  -ECHO scheduled for a.m. to assess for intramural thrombus/emboli potential  -MRI of the head deferred to a.m. physician; patient will require sedation if deemed necessary  -PT/OT/SLP consultation placed  -NEURO consult placed for further recommendations     Possible UTI  -Patient with 3+ bacteria but without RBCs, LE, or nitrite  -Procalcitonin elevated suggestive of underlying bacterial process  -Following collection of cultures, patient initiated on IV Rocephin 1 g daily    Prediabetes with insulin resistance  -A1c ordered with results pending at time of dictation  - POC glucose checks and SSI Humalog NOT initiated at this time  -Carbohydrate restriction placed on diet    DVT prophylaxis-Lovenox 40 mg subcu daily  Code status-full code  Diet-cardiac VENU with carb restriction  IV access-PIV established in ED      Admit as inpatient. I anticipate hospitalization spanning more than two midnights for investigation and treatment of the above medically necessary diagnoses. Comment: Please note this report has been produced using speech recognition software and may contain errors related to that system including errors in grammar, punctuation, and spelling, as well as words and phrases that may be inappropriate. If there are any questions or concerns please feel free to contact the dictating provider for clarification.          Candido Gregg MD    9/4/2022 6:10 PM

## 2022-09-04 NOTE — PROGRESS NOTES
4 Eyes Skin Assessment     The patient is being assess for  Admission    I agree that 2 RN's have performed a thorough Head to Toe Skin Assessment on the patient. ALL assessment sites listed below have been assessed. Areas assessed by both nurses: Lavella Sites  [x]   Head, Face, and Ears   [x]   Shoulders, Back, and Chest  [x]   Arms, Elbows, and Hands   [x]   Coccyx, Sacrum, and Ischum  [x]   Legs, Feet, and Heels        Does the Patient have Skin Breakdown?   No         Shayne Prevention initiated:  Yes   Wound Care Orders initiated:  NA      [facility] nurse consulted for Pressure Injury (Stage 3,4, Unstageable, DTI, NWPT, and Complex wounds):  NA      Nurse 1 eSignature: Electronically signed by Michael Lawrence RN on 9/4/22 at 7:32 PM EDT    **SHARE this note so that the co-signing nurse is able to place an eSignature**    Nurse 2 eSignature: Electronically signed by Angela Lopes RN on 9/4/22 at 10:05 PM EDT

## 2022-09-05 LAB
A/G RATIO: 0.9 (ref 1.1–2.2)
ALBUMIN SERPL-MCNC: 3.6 G/DL (ref 3.4–5)
ALP BLD-CCNC: 114 U/L (ref 40–129)
ALT SERPL-CCNC: 9 U/L (ref 10–40)
ANION GAP SERPL CALCULATED.3IONS-SCNC: 11 MMOL/L (ref 3–16)
AST SERPL-CCNC: 19 U/L (ref 15–37)
BASOPHILS ABSOLUTE: 0.1 K/UL (ref 0–0.2)
BASOPHILS RELATIVE PERCENT: 1.3 %
BILIRUB SERPL-MCNC: 0.9 MG/DL (ref 0–1)
BUN BLDV-MCNC: 13 MG/DL (ref 7–20)
CALCIUM SERPL-MCNC: 9.4 MG/DL (ref 8.3–10.6)
CHLORIDE BLD-SCNC: 103 MMOL/L (ref 99–110)
CHOLESTEROL, TOTAL: 192 MG/DL (ref 0–199)
CO2: 25 MMOL/L (ref 21–32)
CREAT SERPL-MCNC: 0.6 MG/DL (ref 0.6–1.2)
EKG ATRIAL RATE: 65 BPM
EKG DIAGNOSIS: NORMAL
EKG P AXIS: 70 DEGREES
EKG P-R INTERVAL: 192 MS
EKG Q-T INTERVAL: 452 MS
EKG QRS DURATION: 130 MS
EKG QTC CALCULATION (BAZETT): 470 MS
EKG R AXIS: 74 DEGREES
EKG T AXIS: 12 DEGREES
EKG VENTRICULAR RATE: 65 BPM
EOSINOPHILS ABSOLUTE: 0.2 K/UL (ref 0–0.6)
EOSINOPHILS RELATIVE PERCENT: 3.1 %
FOLATE: 18.42 NG/ML (ref 4.78–24.2)
GFR AFRICAN AMERICAN: >60
GFR NON-AFRICAN AMERICAN: >60
GLUCOSE BLD-MCNC: 93 MG/DL (ref 70–99)
HCT VFR BLD CALC: 40.9 % (ref 36–48)
HDLC SERPL-MCNC: 33 MG/DL (ref 40–60)
HEMOGLOBIN: 13.6 G/DL (ref 12–16)
LDL CHOLESTEROL CALCULATED: 128 MG/DL
LYMPHOCYTES ABSOLUTE: 2.4 K/UL (ref 1–5.1)
LYMPHOCYTES RELATIVE PERCENT: 33.1 %
MAGNESIUM: 1.8 MG/DL (ref 1.8–2.4)
MCH RBC QN AUTO: 31.5 PG (ref 26–34)
MCHC RBC AUTO-ENTMCNC: 33.1 G/DL (ref 31–36)
MCV RBC AUTO: 94.9 FL (ref 80–100)
MONOCYTES ABSOLUTE: 0.7 K/UL (ref 0–1.3)
MONOCYTES RELATIVE PERCENT: 9.7 %
NEUTROPHILS ABSOLUTE: 3.8 K/UL (ref 1.7–7.7)
NEUTROPHILS RELATIVE PERCENT: 52.8 %
PDW BLD-RTO: 12.4 % (ref 12.4–15.4)
PLATELET # BLD: 263 K/UL (ref 135–450)
PMV BLD AUTO: 6.8 FL (ref 5–10.5)
POTASSIUM REFLEX MAGNESIUM: 3.2 MMOL/L (ref 3.5–5.1)
PREALBUMIN: 19.1 MG/DL (ref 20–40)
RBC # BLD: 4.31 M/UL (ref 4–5.2)
SODIUM BLD-SCNC: 139 MMOL/L (ref 136–145)
T4 FREE: 1 NG/DL (ref 0.9–1.8)
TOTAL PROTEIN: 7.5 G/DL (ref 6.4–8.2)
TRIGL SERPL-MCNC: 157 MG/DL (ref 0–150)
TSH SERPL DL<=0.05 MIU/L-ACNC: 2.51 UIU/ML (ref 0.27–4.2)
VITAMIN B-12: 630 PG/ML (ref 211–911)
VITAMIN D 25-HYDROXY: 39.7 NG/ML
VLDLC SERPL CALC-MCNC: 31 MG/DL
WBC # BLD: 7.3 K/UL (ref 4–11)

## 2022-09-05 PROCEDURE — 2580000003 HC RX 258: Performed by: HOSPITALIST

## 2022-09-05 PROCEDURE — 80053 COMPREHEN METABOLIC PANEL: CPT

## 2022-09-05 PROCEDURE — 99223 1ST HOSP IP/OBS HIGH 75: CPT | Performed by: NURSE PRACTITIONER

## 2022-09-05 PROCEDURE — 80061 LIPID PANEL: CPT

## 2022-09-05 PROCEDURE — 97535 SELF CARE MNGMENT TRAINING: CPT

## 2022-09-05 PROCEDURE — 97116 GAIT TRAINING THERAPY: CPT

## 2022-09-05 PROCEDURE — 1200000000 HC SEMI PRIVATE

## 2022-09-05 PROCEDURE — 6360000002 HC RX W HCPCS: Performed by: HOSPITALIST

## 2022-09-05 PROCEDURE — 36415 COLL VENOUS BLD VENIPUNCTURE: CPT

## 2022-09-05 PROCEDURE — 6370000000 HC RX 637 (ALT 250 FOR IP): Performed by: HOSPITALIST

## 2022-09-05 PROCEDURE — 83735 ASSAY OF MAGNESIUM: CPT

## 2022-09-05 PROCEDURE — 92610 EVALUATE SWALLOWING FUNCTION: CPT

## 2022-09-05 PROCEDURE — 97530 THERAPEUTIC ACTIVITIES: CPT

## 2022-09-05 PROCEDURE — 83036 HEMOGLOBIN GLYCOSYLATED A1C: CPT

## 2022-09-05 PROCEDURE — 97166 OT EVAL MOD COMPLEX 45 MIN: CPT

## 2022-09-05 PROCEDURE — 93010 ELECTROCARDIOGRAM REPORT: CPT | Performed by: INTERNAL MEDICINE

## 2022-09-05 PROCEDURE — 97162 PT EVAL MOD COMPLEX 30 MIN: CPT

## 2022-09-05 PROCEDURE — 85025 COMPLETE CBC W/AUTO DIFF WBC: CPT

## 2022-09-05 RX ADMIN — GABAPENTIN 200 MG: 100 CAPSULE ORAL at 09:53

## 2022-09-05 RX ADMIN — ASPIRIN 81 MG: 81 TABLET, COATED ORAL at 09:53

## 2022-09-05 RX ADMIN — ENOXAPARIN SODIUM 30 MG: 100 INJECTION SUBCUTANEOUS at 09:54

## 2022-09-05 RX ADMIN — ATENOLOL 50 MG: 25 TABLET ORAL at 09:53

## 2022-09-05 RX ADMIN — GABAPENTIN 200 MG: 100 CAPSULE ORAL at 20:10

## 2022-09-05 RX ADMIN — Medication 10 ML: at 20:10

## 2022-09-05 RX ADMIN — CEFTRIAXONE SODIUM 1000 MG: 1 INJECTION, POWDER, FOR SOLUTION INTRAMUSCULAR; INTRAVENOUS at 17:46

## 2022-09-05 RX ADMIN — Medication 10 ML: at 09:56

## 2022-09-05 NOTE — PROGRESS NOTES
End of shift report given to [facility]. Call light within reach, bed in lowest position, no needs at this time.

## 2022-09-05 NOTE — CARE COORDINATION
CASE MANAGEMENT INITIAL ASSESSMENT      Reviewed chart and completed assessment with patient:no, pt confused   Family present: called son Latonya Keller and  Josh Vidal   Explained Case Management role/services. yes    Primary contact information:Alexis, spouse & Latonya Keller, child    Health Care Decision Maker :   Primary Decision Maker: Alexis Min - Spouse - [phone]    Secondary Decision Maker: Herlinda Mcdonald - Child          Can this person be reached and be able to respond quickly, such as within a few minutes or hours? Yes    Admit date/status:9/4/22  Diagnosis:Acute encephalopathy   Is this a Readmission?:  No      Insurance:Medicare   Precert required for SNF: Yes       3 night stay required: No    Living arrangements, Adls, care needs, prior to admission:Lives at [facility] with , Micha Collazo prior to admission     Durable Medical Equipment at home:  Walker__Cane__RTS__ BSC__Shower Chair__  02__ HHN__ CPAP__  BiPap__  Hospital Bed__ W/C___ Other_____    Services in the home and/or outpatient, prior to admission:none    [facility]                                Medications:yes Prescription coverage? Yes Will pt require financial assistance with medications No     Transportation needs: [place]     PT/OT recs:snf    Hospital Exemption Notification (HEN):needed for snf    Barriers to discharge:none    Plan/comments:Referred to patient'  and son Jd/Will for discharge planning due to pt being confused. Latonya Keller states that prior to admission pt was Micha Collazo and living at [facility] with her . [facility] declining snf [facility]. No other needs at this time.    Electronically signed by ADEEL Sánchez Student  on 9/5/2022 at 12:29 PM   Electronically signed by ADEEL Webb on 9/5/2022 at 4:13 PM     ECOC on chart for MD signature

## 2022-09-05 NOTE — PROGRESS NOTES
Occupational Therapy  Facility/Department: [facility] A2 CARD TELEMETRY  Occupational Therapy Initial Assessment & Treatment    Name: Sebastian Salazar  : 1935  MRN: [id number]  Date of Service: 2022    Discharge Recommendations:  Continue to assess pending progress (ARU vs Home w/ 24hr and HHOT)  OT Equipment Recommendations  Equipment Needed:  (CTA)       Patient Diagnosis(es): The encounter diagnosis was Altered mental status, unspecified altered mental status type. Past Medical History:  has a past medical history of Cancer ([address] Hyperlipidemia, Hypertension, Trigeminal neuralgia, and Trigeminal neuralgia. Past Surgical History:  has a past surgical history that includes ERCP (N/A, 2019); ERCP (2019); ERCP (2019); Mastectomy (Left); and Cholecystectomy, laparoscopic (N/A, 2019). Assessment   Assessment: Pt is a 81 yo F who presents to [facility] with AMS and acute encephalopathy. PTA, pt reports moving into new apartment with spouse (pt reports not custodial or Ind living) and performs functional mobility IND with no AD, but own RW; RN reports possibly from memory care. Pt demo'd cognitive deficits throughout eval, therefore home set up may need to be verified with family - CTA. Pt currently requiring CGA/SBA for bed mobility, transfers and ambulation. Pt would benefit from continued skilled OT to address current deficits. If presenting cognitive deficits are new, pt would benefit from ARU due to PT, OT and SLP needs. If pt is at baseline, anticipate pt able to d/c home with 24hr supervision and HHOT to maximize safety and function.   Prognosis: Fair  Decision Making: Medium Complexity  REQUIRES OT FOLLOW-UP: Yes  Activity Tolerance  Activity Tolerance: Patient Tolerated treatment well;Treatment limited secondary to decreased cognition        Plan   Plan  Times per Week: 3-5x/wk  Current Treatment Recommendations: Strengthening, Balance training, Functional mobility training, Endurance training, Cognitive reorientation, Safety education & training, Patient/Caregiver education & training, Self-Care / ADL     Restrictions  Restrictions/Precautions  Restrictions/Precautions: Fall Risk, General Precautions  Position Activity Restriction  Other position/activity restrictions: IV, telemetry, no sticks/BP in L arm    Subjective   General  Chart Reviewed: Yes  Patient assessed for rehabilitation services?: Yes  Subjective  Subjective: pt resting comfortably in bed w/ sitter present. Pt denies any pain  pt denies any pain    Social/Functional History  Social/Functional History  Lives With: Spouse  Type of Home: Apartment (in the process fo moving to)  Home Layout: One level (1st floor unit)  Home Access: Level entry  Bathroom Shower/Tub: Tub/Shower unit  Bathroom Toilet: Standard  Home Equipment: Pablo Au, rolling  Has the patient had two or more falls in the past year or any fall with injury in the past year?: No  ADL Assistance: Independent  Meal Prep:  (pt has Meals on Wheels service delivery)  Ambulation Assistance: Independent  Transfer Assistance: Independent  Active : Yes  Mode of Transportation: Car     Objective   Heart Rate: 71  Heart Rate Source: Monitor  BP: (!) 163/80  BP Location: Right upper arm  BP Method: Automatic  Patient Position: Semi fowlers  MAP (Calculated): 107.67  Resp: 18  SpO2: 100 %  O2 Device: None (Room air)      Safety Devices  Type of Devices: Sitter present;Nurse notified;Gait belt;Left in bed (Pt too light for bed alarm)    Bed Mobility Training  Bed Mobility Training: Yes  Overall Level of Assistance: Assist X1;Additional time; Adaptive equipment;Contact-guard assistance  Rolling: Contact-guard assistance  Supine to Sit: Contact-guard assistance  Sit to Supine: Stand-by assistance  Scooting: Stand-by assistance  Balance  Sitting: Intact  Standing: Impaired  Standing - Static: Occasional;Good (2 LOB in static standing)  Standing - Dynamic: Fair;Occasional  Transfer made  Insights: Decreased awareness of deficits  Initiation: Does not require cues  Sequencing: Requires cues for some  Cognition Comment: pt required max cues for redirection to tasks and for sequencing. pt may be using comic relief to mask cognitive deficits  Orientation  Overall Orientation Status: Impaired  Orientation Level: Oriented to place;Oriented to person;Disoriented to situation;Disoriented to time     Education Given To: Patient  Education Provided: Role of Therapy;Precautions; ADL Adaptive Strategies; Energy Conservation;Transfer Training;Plan of Care  Education Method: Demonstration;Verbal  Barriers to Learning: Cognition  Education Outcome: Verbalized understanding;Continued education needed    Disease Specific Education: Pt educated on importance of OOB mobility, prevention of complications of bedrest, and general safety during hospitalization. Pt verbalized understanding, but cont ed is needed. AM-PAC Score  AM-PAC Inpatient Daily Activity Raw Score: 17 (09/05/22 1043)  AM-PAC Inpatient ADL T-Scale Score : 37.26 (09/05/22 1043)  ADL Inpatient CMS 0-100% Score: 50.11 (09/05/22 1043)  ADL Inpatient CMS G-Code Modifier : CK (09/05/22 1043)    Goals  Short Term Goals  Time Frame for Short term goals: 1 week (9/12) unless otherwise specified  Short Term Goal 1: Pt will complete bed mobility w/ SPV  Short Term Goal 2: Pt will complete toileting w/ SPV and LRAD (9/08)  Short Term Goal 3: Pt will complete 2-3 grooming tasks in stance w/ SBA and no verbal cues for sequencing  Short Term Goal 4: Pt will verbalize 4 words from memory provided by OT w/ no cues  Patient Goals   Patient goals : to go home and be with my      Therapy Time   Individual Concurrent Group Co-treatment   Time In 0911         Time Out 0949         Minutes 38         Timed Code Treatment Minutes: 28 Minutes (10 min eval)     If pt is unable to be seen after this session, please let this note serve as discharge summary.   Please see case management note for discharge disposition. Thank you.      Chelly Henry OTR/L

## 2022-09-05 NOTE — PROGRESS NOTES
Patient's son and  here at bedside. Per son, patient and  live at [facility] where they have assisted living. [facility] also offers skilled per son. Will make case management aware.     Kevin De Paz RN

## 2022-09-05 NOTE — PLAN OF CARE
Problem: SLP Adult - Impaired Swallowing  Goal: By Discharge: Advance to least restrictive diet without signs or symptoms of aspiration for planned discharge setting. See evaluation for individualized goals.   Outcome: Adequate for Discharge

## 2022-09-05 NOTE — PROGRESS NOTES
Hospitalist Progress Note      PCP: Soumya Soliz, APRN - CNP    Date of Admission: 9/4/2022    Chief Complaint: Headache and AMS    Hospital Course: 83 yo female presented to [facility] for headaches and confusion. Found to have acute cystitis and abdominal purulent wound. On rocephin and improving. Due to language deficit, CVA workup underway as patient has risk factors     Subjective: Patient is feeling better. She is without fever, chills, abdominal pain, headache. She is eager to get home. She has been walking with staff and is without deficits. Mentation greatly improving        Medications:  Reviewed    Infusion Medications    sodium chloride       Scheduled Medications    cefTRIAXone (ROCEPHIN) IV  1,000 mg IntraVENous Q24H    gabapentin  200 mg Oral BID    lidocaine  1 patch TransDERmal Daily    atenolol  50 mg Oral Daily    sodium chloride flush  10 mL IntraVENous 2 times per day    enoxaparin  30 mg SubCUTAneous Daily    aspirin  81 mg Oral Daily    Or    aspirin  300 mg Rectal Daily     PRN Meds: haloperidol lactate, perflutren lipid microspheres, sodium chloride flush, sodium chloride, senna, bisacodyl, labetalol, acetaminophen **OR** acetaminophen, ondansetron      Intake/Output Summary (Last 24 hours) at 9/5/2022 1500  Last data filed at 9/5/2022 1331  Gross per 24 hour   Intake 440 ml   Output 1120 ml   Net -680 ml       Physical Exam Performed:    BP (!) 163/80   Pulse 71   Temp 98.2 °F (36.8 °C) (Oral)   Resp 18   Ht 5' 4\" (1.626 m)   Wt 85 lb 14.4 oz (39 kg)   SpO2 100%   BMI 14.74 kg/m²     General appearance: No apparent distress, appears stated age and cooperative. Thin  HEENT: Pupils equal, round, and reactive to light. Conjunctivae/corneas clear. Neck: Supple, with full range of motion. No jugular venous distention. Trachea midline. Respiratory:  Normal respiratory effort. Clear to auscultation, bilaterally without Rales/Wheezes/Rhonchi.   Cardiovascular: Regular rate and rhythm with normal S1/S2 without murmurs, rubs or gallops. Abdomen: Soft, non-tender, non-distended with normal bowel sounds. Musculoskeletal: No clubbing, cyanosis or edema bilaterally. Full range of motion without deformity. Skin: Skin color, texture, turgor normal.  No rashes or lesions. Bandage over umbilical wound, No erythema   Neurologic:  Neurovascularly intact without any focal sensory/motor deficits. Cranial nerves: II-XII intact, grossly non-focal.  Psychiatric: Alert and oriented, thought content appropriate, normal insight  Capillary Refill: Brisk, 3 seconds, normal   Peripheral Pulses: +2 palpable, equal bilaterally       Labs:   Recent Labs     09/04/22  1310 09/05/22  0629   WBC 9.3 7.3   HGB 14.4 13.6   HCT 42.8 40.9    263     Recent Labs     09/04/22  1310 09/05/22  0629   * 139   K 3.4* 3.2*   CL 95* 103   CO2 29 25   BUN 15 13   CREATININE 0.8 0.6   CALCIUM 9.7 9.4     Recent Labs     09/04/22  1310 09/05/22  0629   AST 22 19   ALT 11 9*   BILIDIR <0.2  --    BILITOT 1.1* 0.9   ALKPHOS 129 114     Recent Labs     09/04/22  1310 09/04/22  1832   INR 1.02 1.12     Recent Labs     09/04/22  1310 09/04/22  1832 09/04/22  2224   TROPONINI <0.01 <0.01 <0.01       Urinalysis:      Lab Results   Component Value Date/Time    NITRU Negative 09/04/2022 03:04 PM    WBCUA 0-2 09/04/2022 03:04 PM    BACTERIA 3+ 09/04/2022 03:04 PM    RBCUA 0-2 09/04/2022 03:04 PM    BLOODU Negative 09/04/2022 03:04 PM    SPECGRAV 1.015 09/04/2022 03:04 PM    GLUCOSEU Negative 09/04/2022 03:04 PM       Radiology:  CT HEAD WO CONTRAST   Final Result   Atrophy and small vessel ischemic disease. XR CHEST PORTABLE   Final Result   Magnified cardiac silhouette without radiographic evidence of acute pulmonary   abnormality seen.          MRI BRAIN WO CONTRAST    (Results Pending)           Assessment/Plan:    Active Hospital Problems    Diagnosis     Acute encephalopathy [G93.40]      Priority: Medium Agitation [R45.1]      Priority: Medium    Borderline hyperglycemia [R73.9]      Priority: Medium    Urinary tract infection without hematuria [N39.0]      Priority: Medium    Hypertensive urgency, malignant [I16.0]      Priority: Medium    HTN (hypertension), benign [I10]     Vitamin D deficiency [E55.9]     Insulin resistance [E88.81]      Acute encephalopathy with language deficit  Possibly due to acute cystitis   -Admit to telemetry under CVA pathway with Q4H neuro checks scheduled overnight  -fall protocol in place  -Initiate EC ASA 81 mg daily antiplatelet agent as well as high-dose statin therapy;  -PRN IV labetalol scheduled for extreme BP elevation; allow permissive HTN initially to ensure watershed blood flow remains intact  -IM Haldol available every 6 hours as needed agitation  -ECHO scheduled for a.m. to assess for intramural thrombus/emboli potential  -MRI of the head ordered   -PT/OT/SLP consultation placed  -NEURO consult placed for further recommendations      Acute cystitis   -Patient with 3+ bacteria but without RBCs, LE, or nitrite  -Following collection of cultures, patient initiated on IV Rocephin 1 g daily     Umbilical wound with purulent drainage  Rocephin as above  Cultures pending     Prediabetes with insulin resistance  -A1c ordered with results pending at time of dictation  - POC glucose checks and SSI Humalog NOT initiated at this time  -Carbohydrate restriction placed on diet    DVT Prophylaxis: Lovenox 40 subq daily   Diet: ADULT DIET;  Regular  Code Status: DNR-CCA  PT/OT Eval Status: Pending     Dispo - Possible discharge in 1-2 days pending further workup     Appropriate for A1 Discharge Unit: Diana Sharif DO

## 2022-09-05 NOTE — PROGRESS NOTES
Physical Therapy  Facility/Department: [facility] A2 CARD TELEMETRY  Physical Therapy Initial Assessment/Treatment    Name: Corinne Judge  : 1935  MRN: [id number]  Date of Service: 2022    Discharge Recommendations:  Continue to assess pending progress (ARU vs home 24hr and HHPT pending verification of PLOF)  PT Equipment Recommendations  Equipment Needed: No      Patient Diagnosis(es): The encounter diagnosis was Altered mental status, unspecified altered mental status type. Past Medical History:  has a past medical history of Cancer ([address] Hyperlipidemia, Hypertension, Trigeminal neuralgia, and Trigeminal neuralgia. Past Surgical History:  has a past surgical history that includes ERCP (N/A, 2019); ERCP (2019); ERCP (2019); Mastectomy (Left); and Cholecystectomy, laparoscopic (N/A, 2019). Assessment   Body Structures, Functions, Activity Limitations Requiring Skilled Therapeutic Intervention: Decreased functional mobility ; Decreased cognition;Decreased endurance;Decreased balance;Decreased strength;Decreased safe awareness  Assessment: Pt presents to [facility] with AMS and acute encephalopathy. PTA, pt reports moving into new apartment with spouse (pt reports not snf or Ind living) and performs functional mobility IND with no AD, but own RW. RN reports possibly from memory care. Pt demo some cognitive deficits during eval, unsure if baseline or new, therefore home set up may need to be verified with family. Pt currently requiring increased assist for bed mobility, transfers and ambulation. Pt would benefit from continued skilled PT to address current deficits. CTA for d/c recommendations; If cognitive deficits are new, pt would benefit from ARU due to PT, OT and SLP needs.  If pt is at baseline, anticipate pt able to d/c home with 24hr supervision and HHPT  Treatment Diagnosis: impaired functional mobility  Therapy Prognosis: Good;Fair  Decision Making: Medium Complexity  Requires PT Follow-Up: Yes  Activity Tolerance  Activity Tolerance: Patient tolerated evaluation without incident;Treatment limited secondary to decreased cognition     Plan   Plan  Plan: 3-5 times per week  Current Treatment Recommendations: Strengthening, Balance training, Functional mobility training, Transfer training, Endurance training, Neuromuscular re-education, Stair training, Gait training, Pain management, Home exercise program, Safety education & training, Patient/Caregiver education & training, Therapeutic activities, Equipment evaluation, education, & procurement  Safety Devices  Type of Devices: Sitter present, Nurse notified, Gait belt, Left in bed (Pt too light for bed alarm)     Restrictions  Restrictions/Precautions  Restrictions/Precautions: Fall Risk, General Precautions  Position Activity Restriction  Other position/activity restrictions: IV, telemetry, no sticks/BP in L arm     Subjective   Pain: pt denies any pain  General  Chart Reviewed: Yes  Patient assessed for rehabilitation services?: Yes  Response To Previous Treatment: Not applicable  Family / Caregiver Present: No  Referring Practitioner: Kannan Warren MD  Referral Date : 09/04/22  Diagnosis: Acute encephalopathy  Follows Commands: Impaired  General Comment  Comments: RN cleared pt for PT eval  Subjective  Subjective: Pt supine in bed upon arrival, agreeable to PT eval         Social/Functional History  Social/Functional History  Lives With: Spouse  Type of Home: Apartment (in the process fo moving to)  Home Layout: One level (1st floor unit)  Home Access: Level entry  Bathroom Shower/Tub: Tub/Shower unit  Bathroom Toilet: Standard  Home Equipment: Dillon Else, rolling  Has the patient had two or more falls in the past year or any fall with injury in the past year?: No  ADL Assistance: Independent  Meal Prep:  (pt has Meals on Wheels service delivery)  Ambulation Assistance: Independent  Transfer Assistance: Independent  Active : Yes  Mode of Transportation: Car    Vision/Hearing  Vision  Vision: Impaired  Vision Exceptions: Wears glasses at all times  Hearing  Hearing: Exceptions to [facility]  Hearing Exceptions: Left hearing aid      Cognition   Orientation  Overall Orientation Status: Impaired  Orientation Level: Oriented to place;Oriented to person;Disoriented to situation;Disoriented to time  Cognition  Overall Cognitive Status: Exceptions  Arousal/Alertness: Appropriate responses to stimuli  Following Commands: Follows one step commands with repetition; Follows multistep commands with repitition  Attention Span: Attends with cues to redirect  Memory: Decreased recall of recent events;Decreased recall of biographical Information  Safety Judgement: Decreased awareness of need for assistance  Problem Solving: Assistance required to identify errors made;Assistance required to correct errors made  Insights: Decreased awareness of deficits  Initiation: Does not require cues  Sequencing: Requires cues for some     Objective   Heart Rate: 71  Heart Rate Source: Monitor  BP: (!) 163/80  BP Location: Right upper arm  BP Method: Automatic  Patient Position: Semi fowlers  MAP (Calculated): 107.67  Resp: 18  SpO2: 100 %  O2 Device: None (Room air)        Gross Assessment  AROM: Within functional limits  PROM: Within functional limits  Strength: Generally decreased, functional  Coordination: Generally decreased, functional (Impaired finger-to-nose and rapid alternating movement)  Tone: Normal  Sensation: Intact     Bed Mobility Training  Bed Mobility Training: Yes  Overall Level of Assistance: Assist X1;Additional time; Adaptive equipment;Contact-guard assistance (HOB flat, use of bedrails)  Rolling: Contact-guard assistance  Supine to Sit: Contact-guard assistance  Sit to Supine: Stand-by assistance  Scooting: Stand-by assistance  Balance  Sitting: Intact  Standing: Impaired  Standing - Static: Occasional;Good (2 LOB static standing at EOB, posterior)  Standing - Concurrent Group Co-treatment   Time In 0911         Time Out 0946         Minutes 35         Timed Code Treatment Minutes: 25 Minutes (10 min eval)     If pt is unable to be seen after this session, please let this note serve as discharge summary. Please see case management note for discharge disposition. Thank you.     Murtaza Gonzales, PT

## 2022-09-05 NOTE — PROGRESS NOTES
Speech Language Pathology  Clinical Bedside Swallow Assessment  Facility/Department: [facility] A2 CARD TELEMETRY        Recommendations:  Diet recommendation: IDDSI 7 Regular Solids; IDDSI 0 Thin Liquids; Meds PO as tolerated  Instrumentation: Not indicated   Risk management: general aspiration precautions    **Cognitive Comment: Of note, pt oriented to self and place only. RN reports AMS is resolving. If there are ongoing concerns re: cognitive deficits different from pt.'s baseline, please order a ST cognitive-linguistic assessment. Shobha Min  : 1935 (82 y.o.)   MRN: [id number]  ROOM: [facility]-  ADMISSION DATE: 2022  PATIENT DIAGNOSIS(ES): Acute encephalopathy [G93.40]  Chief Complaint   Patient presents with    Headache     Patient with intermittent headaches during the last week. Patient denies vision changes, dizziness or lightheadedness. Altered Mental Status     Patient with intermittent difficulty find thoughts or the right words,  states this is new within the last 2-3 days.      Patient Active Problem List    Diagnosis Date Noted    Acute encephalopathy 2022    Agitation 2022    Borderline hyperglycemia 2022    Urinary tract infection without hematuria 2022    Hypertensive urgency, malignant 2022    S/P laparoscopic cholecystectomy     Acute gallstone pancreatitis 2019    Cholecystitis 2019    Abnormal liver function tests 2019    Adnexal cyst 2019    Pancreatitis 2019    Pancreatitis, unspecified pancreatitis type 2019    Trigeminal neuralgia 2018    HTN (hypertension), benign 2018    Hyperlipidemia 2012    Vitamin D deficiency 2012    Insulin resistance 2012     Past Medical History:   Diagnosis Date    Cancer ([facility] [address]     breast    Hyperlipidemia     Hypertension     Trigeminal neuralgia     Trigeminal neuralgia 2018     Past Surgical History:   Procedure Laterality Date CHOLECYSTECTOMY, LAPAROSCOPIC N/A 2/19/2019    LAPAROSCOPIC CHOLECYSTECTOMY WITH INTRAOPERATIVE CHOLANGIOGRAM performed by Tres Irwin MD at [place] [address]    ERCP N/A 2/4/2019    ERCP ENDOSCOPIC RETROGRADE CHOLANGIOPANCREATOGRAPHY performed by Inder Woods MD at [place] [address]    ERCP  2/4/2019    ERCP SPHINCTER/PAPILLOTOMY performed by Inder Woods MD at [place] [address]    ERCP  2/4/2019    ERCP DILATION BALLOON performed by Inder Woods MD at . [address]     1976     Allergies   Allergen Reactions    Erythromycin      cough     ADMIN ONSET:  9/5/22    Date of Evaluation: 9/5/2022   Evaluating Therapist: YOBANI Estrada    Chart Reviewed: : [x] Yes [] No    Current Diet: ADULT DIET; Regular    Recent Chest Radiography: [x] Chest XR   [] CT of Chest  Date: 9/5/22  Impressions  Impression   Magnified cardiac silhouette without radiographic evidence of acute pulmonary   abnormality seen. Pain: Denies     Reason for Referral  Austen Frankel was referred for a bedside swallow evaluation to assess the efficiency of their swallow function, identify signs and symptoms of aspiration and make recommendations regarding safe dietary consistencies, effective compensatory strategies, and safe eating environment. Assessment    History of Present Illness:  Per MD, Austen Frankel is a 82 y.o. female who presented to the ED to be evaluated for a 1 week history of headache, accompanied by acute encephalopathy ongoing for 2 days PTA. Patient has been speaking in word salads but without obvious dysarthria, diplopia, hemiparesis, or vertigo. History is obtained from patient's son who is present at the bedside, as patient is acutely confused and agitated. Patient has no previous history of CVA but does have multiple risk factors including HTN, HLD, type II DM, and prior tobacco use.      Upon arrival to the ED EKG was obtained revealing NSR with nonspecific T wave abnormalities with possible anterior ischemia. Stat head CT notable for atrophy, ventricular prominence, and small vessel ischemic changes. CXR with cardiomegaly but no evidence of pulmonary congestion. Notable labs include: Mild hyponatremia 134, hypokalemia 3.4, hyperglycemia 149, negative UDS, and UA with 3+ bacteria. Patient was extremely agitated and somewhat combative in ED thus necessitating administration of IM Haldol x1. Predisposing dysphagia risk factors: N/A, parapharyngeal mass per MRI 2018? Clinical signs of possible chronic dysphagia: N/A  Precipitating dysphagia risk factors: AMS    Patient Complaints:  Odynophagia: [] Yes [x] No  Globus Sensation: [] Yes [x] No  SOB with PO intake: [] Yes [x] No  Increased WOB with PO intake: [] Yes [x] No  Reflux Sx's: [] Yes [x] No  Weight loss: [] Yes [x] No  Coughing/Choking with PO intake: [] Yes [x] No  Reduced Appetite: [] Yes [x] No    Vitals:    09/05/22    BP: (!) 163/80   Pulse: 71   Resp: 18   Temp: 98.2   SpO2: 100     CBC:   Recent Labs     09/05/22  0629   WBC 7.3   HGB 13.6         BMP:  Recent Labs     09/05/22  0629      K 3.2*      CO2 25   BUN 13   CREATININE 0.6   GLUCOSE 93       Cranial nerve exam:   CN V (trigeminal): ophthalmic, maxillary, and mandibular facial sensation- WNL  CN VII (facial): WNL  CN IX/X (glossopharyngeal/vagus): MPT: DNT; pitch range: DNT; vocal quality: WNL ; cough: DNT  CN XII (hypoglossal): WNL    Laryngeal function exam:   Secretions: WNL   Vocal quality: See CN exam above  MPT: DNT  S/Z ratio: DNT  Pitch range: DNT  Cough: See DNT    Oral Care Status:    [] Oral Care [facility]  [] Poor oral care status  [] Edentulous  [] Upper Dentures  [] Lower Dentures  [x] Missing/Broken Teeth  [] Evidence of dental cavities/carries    PO trials:   IDDSI 0 (thin): Thin straw/ cup; No s/ of aspiration- no cough, choke or wet vocal quality observed     IDDSI 6 (soft and bite sized):   [place];  No s/ of aspiration- no cough, choke or wet vocal quality observed     IDDSI 7 (regular):   [place]; No s/ of aspiration- no cough, choke or wet vocal quality observed; prolonged mastication, h/w given additional time full oral clearance achieved. 3 oz water: DNT     Impressions:  No clinical indication of dysphagia. Oropharyngeal swallow appears grossly intact at the bedside. No overt s/ of aspiration observed across trials. Pt denies all clinical indication of dysphagia. Patient is safe to continue a IDDSI 7/0 Regular, Thin Liquid diet with general safe swallow and reflux precautions. No further ST services warranted. Recommendations:  Diet recommendation: IDDSI 7 Regular Solids; IDDSI 0 Thin Liquids; Meds PO as tolerated  Instrumentation: Not indicated   Risk management: general aspiration precautions    Prognosis: Good                                   Pt Education: SLP educated the patient re: Role of SLP, rationale for completion of assessment, and recommendations  Pt Education Response: verbalized understanding and no evidence of learning    Individuals Consulted:   [x]  Patient     []  NP         [x]  RN   []  RD                   []  MD      []  Family Member                        []  PA    []  Other:    Safety Devices / Report:  [x]  All fall risk precautions in place []  Safety handoff completed with RN  []  Bed alarm in place  []  Left in bed     []  Chair alarm in place  []  Left in chair   []  Call light in reach   [x]  Other: Sitter present                Total Treatment Time / Charges       Time in Time out Total Time / units   Swallow Eval/Tx Time  0832 0848 16 mins/ 1 unit      Signature: Thank you.    Prema Mittal M.A, CCC-SLP/ CBIS

## 2022-09-05 NOTE — CONSULTS
In patient Neurology consult        [facility]      Denice Jeans, MD Joie Kingfisher  1935    Date of Service: 9/5/2022    Referring Physician: Lasha Acuna DO    Most of the history was obtained from detailed chart reviewing and discussion with the patient's nurse. The patient is currently confused and unable to provide me with accurate history. Reason for the consult and CC: Acute encephalopathy    HPI:   The patient is a 82 y.o.  female, with a PMH of HTN, HLD, who presented to [facility] with acute encephalopathy. History is obtained from EMR. She was reportedly confused at home and complaining of a headache. She was found to have a UTI on admission. She has a sitter at the bedside, although at the time of my exam, she is calm. She is pleasantly confused and able to follow simple commands.       Family History   Problem Relation Age of Onset    Cancer Mother         unsure         Past Medical History:   Diagnosis Date    Cancer [facility])     breast    Hyperlipidemia     Hypertension     Trigeminal neuralgia     Trigeminal neuralgia 7/19/2018     Past Surgical History:   Procedure Laterality Date    CHOLECYSTECTOMY, LAPAROSCOPIC N/A 2/19/2019    LAPAROSCOPIC CHOLECYSTECTOMY WITH INTRAOPERATIVE CHOLANGIOGRAM performed by Cassandra Barrientos MD at [place] [address]    ERCP N/A 2/4/2019    ERCP ENDOSCOPIC RETROGRADE CHOLANGIOPANCREATOGRAPHY performed by Marvin Aj MD at [place] [address]    ERCP  2/4/2019    ERCP SPHINCTER/PAPILLOTOMY performed by Marvin Aj MD at [place] [address]    ERCP  2/4/2019    ERCP DILATION BALLOON performed by Marvin Aj MD at [facility] Left     1976     Social History     Tobacco Use    Smoking status: Never    Smokeless tobacco: Never   Vaping Use    Vaping Use: Never used   Substance Use Topics    Alcohol use: No    Drug use: No     Allergies   Allergen Reactions    Erythromycin      cough     Current Facility-Administered Medications   Medication Dose Route Frequency Provider Last Rate Last Admin    haloperidol lactate (HALDOL) injection 2 mg  2 mg IntraMUSCular Q6H PRN Trinidad Edge MD        cefTRIAXone (ROCEPHIN) 1,000 mg in dextrose 5 % 50 mL IVPB mini-bag  1,000 mg IntraVENous Q24H Trinidad Edge MD   Stopped at 09/04/22 2046    gabapentin (NEURONTIN) capsule 200 mg  200 mg Oral BID Trinidad Edge MD   200 mg at 09/05/22 0953    lidocaine 4 % external patch 1 patch  1 patch TransDERmal Daily Trinidad Edge MD        atenolol (TENORMIN) tablet 50 mg  50 mg Oral Daily Trinidad Edge MD   50 mg at 09/05/22 9776    perflutren lipid microspheres (DEFINITY) injection 1.65 mg  1.5 mL IntraVENous ONCE PRN Trinidad Edge MD        sodium chloride flush 0.9 % injection 10 mL  10 mL IntraVENous 2 times per day Trinidad dEge MD   10 mL at 09/05/22 0956    sodium chloride flush 0.9 % injection 10 mL  10 mL IntraVENous PRN Trinidad Edge MD        0.9 % sodium chloride infusion   IntraVENous PRN Trinidad Edge MD        senna (SENOKOT) tablet 8.6 mg  1 tablet Oral Daily PRN Trinidad Edge MD        bisacodyl (DULCOLAX) suppository 10 mg  10 mg Rectal Daily PRN Trinidad Edge MD        enoxaparin Sodium (LOVENOX) injection 30 mg  30 mg SubCUTAneous Daily Trinidad Edge MD   30 mg at 09/05/22 3077    aspirin EC tablet 81 mg  81 mg Oral Daily Trinidad Edge MD   81 mg at 09/05/22 0380    Or    aspirin suppository 300 mg  300 mg Rectal Daily Trinidad Edge MD        labetalol (NORMODYNE;TRANDATE) injection 10 mg  10 mg IntraVENous Q10 Min PRN Trinidad Edge MD        acetaminophen (TYLENOL) tablet 650 mg  650 mg Oral Q4H PRN Trinidad Edge MD        Or    acetaminophen (TYLENOL) suppository 650 mg  650 mg Rectal Q4H PRN Trinidad Edge MD        [facility]ron [facility]) injection 4 mg  4 mg IntraVENous Q6H PRN Trinidad Edge MD           ROS: 10-14 system review, reviewed with patient's family and/or nurse was unremarkable except mentioned in HPI. Constitutional:   Vitals:    09/05/22 0501 09/05/22 0800 09/05/22 0920 09/05/22 0953   BP: (!) 181/72 (!) 169/72 (!) 163/80 (!) 163/80   Pulse: 58 50 71    Resp: 18 18     Temp: 98.1 °F (36.7 °C) 98.2 °F (36.8 °C)     TempSrc: Oral Oral     SpO2: 96% 100% 100%    Weight:       Height:       General appearance: Awake, alert, pleasantly Confused   Eye: Examination of conjunctiva and lids: No eye lid drooping, no redness or abnormal conjunctival coloration. Examination of pupil and irises: Pupil round, regular and reactive bilaterally direct and consensual. Iris is symmetric. Neck: Neck is supple. No thyromegaly  Cardiovascular: No lower leg edema with good pulsation. No carotid bruit  Neurological: Cranial nerves: Pupil round regular and reactive, extraocular muscle intact, no gaze preference, face is symmetric, uvula midline, tongue is midline. DTRs: Symmetric 2+ throughout arms and legs  Sensation: No sensory deficit or abnormal sensation  Plantars: Flexor bilaterally. Musculoskeletal:  The patient can move all 4 extremities. No apparent focal weakness. Normal tone and range of motion. Psychiatric: Poor judgment and insight. Poor orientation, waxing and waning. Easily distracted. Labile affect. Respiratory: Respiratory effort: Normal.  Palpation: No abnormal deformities. Skin: Inspection of the skin: Normal , no abnormal lesion. Palpation: No palpable nodules  ENT: No abnormalities with nasal mucosa.   No abnormalities with oropharynx and palate is symmetric    Data:  LABS:   Lab Results   Component Value Date/Time     09/05/2022 06:29 AM    K 3.2 09/05/2022 06:29 AM     09/05/2022 06:29 AM    CO2 25 09/05/2022 06:29 AM    BUN 13 09/05/2022 06:29 AM    CREATININE 0.6 09/05/2022 06:29 AM    GFRAA >60 09/05/2022 06:29 AM    GFRAA 40 02/06/2012 10:43 AM    LABGLOM >60 09/05/2022 06:29 AM    GLUCOSE 93 09/05/2022 06:29 AM    GLUCOSE 102 02/06/2012 10:43 AM    PHOS 4.0 02/05/2019 04:59 AM

## 2022-09-06 ENCOUNTER — APPOINTMENT (OUTPATIENT)
Dept: MRI IMAGING | Age: 87
DRG: 602 | End: 2022-09-06
Payer: MEDICARE

## 2022-09-06 VITALS
OXYGEN SATURATION: 100 % | WEIGHT: 85.9 LBS | BODY MASS INDEX: 14.66 KG/M2 | TEMPERATURE: 97.5 F | DIASTOLIC BLOOD PRESSURE: 71 MMHG | RESPIRATION RATE: 18 BRPM | HEIGHT: 64 IN | SYSTOLIC BLOOD PRESSURE: 139 MMHG | HEART RATE: 70 BPM

## 2022-09-06 PROBLEM — R73.03 PREDIABETES: Status: ACTIVE | Noted: 2022-09-06

## 2022-09-06 PROBLEM — F02.80 DEMENTIA DUE TO ALZHEIMER'S DISEASE (HCC): Status: ACTIVE | Noted: 2022-09-06

## 2022-09-06 PROBLEM — G30.9 DEMENTIA DUE TO ALZHEIMER'S DISEASE (HCC): Status: ACTIVE | Noted: 2022-09-06

## 2022-09-06 PROBLEM — G93.41 ENCEPHALOPATHY, METABOLIC: Status: ACTIVE | Noted: 2022-09-06

## 2022-09-06 PROBLEM — L03.90 CELLULITIS: Status: ACTIVE | Noted: 2022-09-06

## 2022-09-06 LAB
ANION GAP SERPL CALCULATED.3IONS-SCNC: 9 MMOL/L (ref 3–16)
BASOPHILS ABSOLUTE: 0.1 K/UL (ref 0–0.2)
BASOPHILS RELATIVE PERCENT: 0.9 %
BUN BLDV-MCNC: 15 MG/DL (ref 7–20)
CALCIUM SERPL-MCNC: 9.1 MG/DL (ref 8.3–10.6)
CHLORIDE BLD-SCNC: 103 MMOL/L (ref 99–110)
CO2: 26 MMOL/L (ref 21–32)
CREAT SERPL-MCNC: 0.6 MG/DL (ref 0.6–1.2)
EOSINOPHILS ABSOLUTE: 0.3 K/UL (ref 0–0.6)
EOSINOPHILS RELATIVE PERCENT: 3.5 %
ESTIMATED AVERAGE GLUCOSE: 122.6 MG/DL
ESTIMATED AVERAGE GLUCOSE: 122.6 MG/DL
GFR AFRICAN AMERICAN: >60
GFR NON-AFRICAN AMERICAN: >60
GLUCOSE BLD-MCNC: 108 MG/DL (ref 70–99)
GRAM STAIN RESULT: ABNORMAL
HBA1C MFR BLD: 5.9 %
HBA1C MFR BLD: 5.9 %
HCT VFR BLD CALC: 38.2 % (ref 36–48)
HEMOGLOBIN: 12.9 G/DL (ref 12–16)
LYMPHOCYTES ABSOLUTE: 2.9 K/UL (ref 1–5.1)
LYMPHOCYTES RELATIVE PERCENT: 40.6 %
MCH RBC QN AUTO: 31.5 PG (ref 26–34)
MCHC RBC AUTO-ENTMCNC: 33.7 G/DL (ref 31–36)
MCV RBC AUTO: 93.6 FL (ref 80–100)
MONOCYTES ABSOLUTE: 0.8 K/UL (ref 0–1.3)
MONOCYTES RELATIVE PERCENT: 11 %
NEUTROPHILS ABSOLUTE: 3.1 K/UL (ref 1.7–7.7)
NEUTROPHILS RELATIVE PERCENT: 44 %
ORGANISM: ABNORMAL
PDW BLD-RTO: 12.3 % (ref 12.4–15.4)
PLATELET # BLD: 253 K/UL (ref 135–450)
PMV BLD AUTO: 6.7 FL (ref 5–10.5)
POTASSIUM SERPL-SCNC: 3.3 MMOL/L (ref 3.5–5.1)
RBC # BLD: 4.08 M/UL (ref 4–5.2)
SODIUM BLD-SCNC: 138 MMOL/L (ref 136–145)
URINE CULTURE, ROUTINE: NORMAL
WBC # BLD: 7.2 K/UL (ref 4–11)
WOUND/ABSCESS: ABNORMAL

## 2022-09-06 PROCEDURE — 83036 HEMOGLOBIN GLYCOSYLATED A1C: CPT

## 2022-09-06 PROCEDURE — 99233 SBSQ HOSP IP/OBS HIGH 50: CPT | Performed by: PSYCHIATRY & NEUROLOGY

## 2022-09-06 PROCEDURE — 6370000000 HC RX 637 (ALT 250 FOR IP): Performed by: INTERNAL MEDICINE

## 2022-09-06 PROCEDURE — 80048 BASIC METABOLIC PNL TOTAL CA: CPT

## 2022-09-06 PROCEDURE — 6360000002 HC RX W HCPCS: Performed by: HOSPITALIST

## 2022-09-06 PROCEDURE — 70551 MRI BRAIN STEM W/O DYE: CPT

## 2022-09-06 PROCEDURE — 85025 COMPLETE CBC W/AUTO DIFF WBC: CPT

## 2022-09-06 PROCEDURE — 36415 COLL VENOUS BLD VENIPUNCTURE: CPT

## 2022-09-06 PROCEDURE — 2580000003 HC RX 258: Performed by: HOSPITALIST

## 2022-09-06 PROCEDURE — 6370000000 HC RX 637 (ALT 250 FOR IP): Performed by: HOSPITALIST

## 2022-09-06 RX ORDER — LISINOPRIL 5 MG/1
5 TABLET ORAL DAILY
Qty: 30 TABLET | Refills: 3 | Status: SHIPPED | OUTPATIENT
Start: 2022-09-07

## 2022-09-06 RX ORDER — CEPHALEXIN 500 MG/1
500 CAPSULE ORAL 4 TIMES DAILY
Qty: 16 CAPSULE | Refills: 0 | Status: SHIPPED | OUTPATIENT
Start: 2022-09-06 | End: 2022-09-10

## 2022-09-06 RX ORDER — LISINOPRIL 5 MG/1
5 TABLET ORAL DAILY
Status: DISCONTINUED | OUTPATIENT
Start: 2022-09-06 | End: 2022-09-06 | Stop reason: HOSPADM

## 2022-09-06 RX ORDER — ASPIRIN 81 MG/1
81 TABLET ORAL DAILY
Qty: 30 TABLET | Refills: 3 | Status: SHIPPED | OUTPATIENT
Start: 2022-09-07

## 2022-09-06 RX ADMIN — ENOXAPARIN SODIUM 30 MG: 100 INJECTION SUBCUTANEOUS at 09:43

## 2022-09-06 RX ADMIN — LISINOPRIL 5 MG: 5 TABLET ORAL at 09:42

## 2022-09-06 RX ADMIN — GABAPENTIN 200 MG: 100 CAPSULE ORAL at 09:42

## 2022-09-06 RX ADMIN — Medication 10 ML: at 09:42

## 2022-09-06 RX ADMIN — ASPIRIN 81 MG: 81 TABLET, COATED ORAL at 09:42

## 2022-09-06 NOTE — PROGRESS NOTES
Physical Therapy  Attempted to see pt for PT tx. Pt resting comfortably in bed at this time. Will continue to follow as schedule allows.     Najma Glasgow, PT, DPT

## 2022-09-06 NOTE — PROGRESS NOTES
CMU notified patient coming off of the monitor for MRI study. Will contact [facility] when patient returns and patient will be placed back on tele monitor.  Electronically signed by Steven Garcia RN on 9/6/22 at 8:15 AM EDT

## 2022-09-06 NOTE — CARE COORDINATION
CASE MANAGEMENT DISCHARGE SUMMARY      Discharge to: home to [facility] with home care through their facility       IMM given: (date) 9/4/22    Transportation: family       Confirmed discharge plan with: patient//RN     Patient: yes     Family:  yes    Name: Max Woods number: [phone]     RN, name: Kari Carlton    Note: Discharging nurse to complete BRENDA, reconcile AVS, and place final copy with patient's discharge packet.

## 2022-09-06 NOTE — PROGRESS NOTES
Marvporsche Karal  Neurology Follow-up  [facility]    Date of Service: 9/6/2022    Subjective:   CC: Follow up today regarding: Acute confusion. Events noted. Chart and lab reviewed. The patient is awake and alert today. Hard of hearing but able to answer questions but appeared his baseline. MRI showed no acute stroke and chronic parapharyngeal mass. She denies any headache, dysphagia dysarthria weakness or numbness or tingling. Other review of system was unremarkable. ROS : A 10-12 system review obtained and updated today and is unremarkable except as mentioned  in my interval history. family history includes Cancer in her mother.     Past Medical History:   Diagnosis Date    Cancer ([facility] [address]     breast    Hyperlipidemia     Hypertension     Trigeminal neuralgia     Trigeminal neuralgia 7/19/2018     Current Facility-Administered Medications   Medication Dose Route Frequency Provider Last Rate Last Admin    lisinopril (PRINIVIL;ZESTRIL) tablet 5 mg  5 mg Oral Daily Chinmay Cydneyess, DO   5 mg at 09/06/22 0942    haloperidol lactate (HALDOL) injection 2 mg  2 mg IntraMUSCular Q6H PRN Terell Wilson MD        cefTRIAXone (ROCEPHIN) 1,000 mg in dextrose 5 % 50 mL IVPB mini-bag  1,000 mg IntraVENous Q24H Terell Wilson MD   Stopped at 09/05/22 1825    gabapentin (NEURONTIN) capsule 200 mg  200 mg Oral BID Terell Wilson MD   200 mg at 09/06/22 0942    lidocaine 4 % external patch 1 patch  1 patch TransDERmal Daily Terell Wilson MD        atenolol (TENORMIN) tablet 50 mg  50 mg Oral Daily Terell Wilson MD   50 mg at 09/05/22 3963    perflutren lipid microspheres (DEFINITY) injection 1.65 mg  1.5 mL IntraVENous ONCE PRN Terell Wilson MD        sodium chloride flush 0.9 % injection 10 mL  10 mL IntraVENous 2 times per day Terell Wilson MD   10 mL at 09/06/22 0942    sodium chloride flush 0.9 % injection 10 mL  10 mL IntraVENous PRN Terell Wilson MD        0.9 % sodium chloride infusion   IntraVENous PRN Debria Soulier, MD        senna (SENOKOT) tablet 8.6 mg  1 tablet Oral Daily PRN Debria Soulier, MD        bisacodyl (DULCOLAX) suppository 10 mg  10 mg Rectal Daily PRN Debria Soulier, MD        enoxaparin Sodium (LOVENOX) injection 30 mg  30 mg SubCUTAneous Daily Debria Soulier, MD   30 mg at 09/06/22 1587    aspirin EC tablet 81 mg  81 mg Oral Daily Debria Soulier, MD   81 mg at 09/06/22 4973    Or    aspirin suppository 300 mg  300 mg Rectal Daily Debria Soulier, MD        labetalol (NORMODYNE;TRANDATE) injection 10 mg  10 mg IntraVENous Q10 Min PRN Debria Soulier, MD        acetaminophen (TYLENOL) tablet 650 mg  650 mg Oral Q4H PRN Debria Soulier, MD        Or    acetaminophen (TYLENOL) suppository 650 mg  650 mg Rectal Q4H PRN Debria Soulier, MD        ond[facility]etron [facility]) injection 4 mg  4 mg IntraVENous Q6H PRN Debria Soulier, MD         Allergies   Allergen Reactions    Erythromycin      cough      reports that she has never smoked. She has never used smokeless tobacco. She reports that she does not drink alcohol and does not use drugs. Objective:  Exam:   Constitutional:   Vitals:    09/05/22 2002 09/05/22 2346 09/06/22 0429 09/06/22 0750   BP: (!) 140/64 (!) 166/70 (!) 169/76 (!) 172/65   Pulse: 69 52 52 (!) 49   Resp: 20 18 18 18   Temp:  97.8 °F (36.6 °C) 97.3 °F (36.3 °C) 97.3 °F (36.3 °C)   TempSrc:  Axillary Axillary Oral   SpO2: 99% 98% 99% 100%   Weight:       Height:         General appearance:  Normal development and appear in no acute distress. Mental Status:   Oriented to person, place, problem, and time. Memory: Poor immediate recall. Intact remote memory  Normal attention span and concentration. Language: intact naming, repeating and fluency   For fund of Knowledge. Cranial Nerves:   II: Visual fields: Full. Pupils: equal, round, reactive to light  III,IV,VI: Extra Ocular Movements are intact.  No nystagmus  V: Facial sensation is intact  VII: Facial strength and movements: intact and symmetric  IX: Palate elevation is symmetric  XI: Shoulder shrug is intact  XII: Tongue movements are normal  Musculoskeletal: 5/5 in all 4 extremities. Tone: Normal tone. Reflexes: Symmetric 2+ in both arms and legs. Coordination: no pronator drift, no dysmetria with FNF  Sensation: normal to all modalities in both arms and legs.   Gait/Posture: steady gait        Data:  LABS:   Lab Results   Component Value Date/Time     09/06/2022 07:13 AM    K 3.3 09/06/2022 07:13 AM    K 3.2 09/05/2022 06:29 AM     09/06/2022 07:13 AM    CO2 26 09/06/2022 07:13 AM    BUN 15 09/06/2022 07:13 AM    CREATININE 0.6 09/06/2022 07:13 AM    GFRAA >60 09/06/2022 07:13 AM    GFRAA 40 02/06/2012 10:43 AM    LABGLOM >60 09/06/2022 07:13 AM    GLUCOSE 108 09/06/2022 07:13 AM    GLUCOSE 102 02/06/2012 10:43 AM    PHOS 4.0 02/05/2019 04:59 AM    MG 1.80 09/05/2022 06:29 AM    CALCIUM 9.1 09/06/2022 07:13 AM     Lab Results   Component Value Date/Time    WBC 7.2 09/06/2022 07:13 AM    RBC 4.08 09/06/2022 07:13 AM    HGB 12.9 09/06/2022 07:13 AM    HCT 38.2 09/06/2022 07:13 AM    MCV 93.6 09/06/2022 07:13 AM    RDW 12.3 09/06/2022 07:13 AM     09/06/2022 07:13 AM     Lab Results   Component Value Date    INR 1.12 09/04/2022    PROTIME 14.2 09/04/2022       Neuroimaging was independently reviewed by me and discussed results with the patient  I reviewed blood testing and other test results and discussed results with the patient      Impression:  Acute encephalopathy secondary to metabolic encephalopathy  UTI  Hypertension, not controlled  Chronic cognitive impairment  Parapharyngeal mass-----follow-up with ENT      Recommendation  Continue current supportive care  PT, OT and speech  Aspirin  Statin  Continue current blood pressure medications and blood pressure control  Telemetry  DVT and GI prophylaxis  Continue hydration and continue antibiotics  Stroke education and MRI results discussed with the patient  Can be discharged once medically stable           Denice Jeans, MD   [phone]      This dictation was generated by voice recognition computer software. Although all attempts are made to edit the dictation for accuracy, there may be errors in the transcription that are not intended.

## 2022-09-06 NOTE — PLAN OF CARE
Problem: Discharge Planning  Goal: Discharge to home or other facility with appropriate resources  Outcome: Progressing     Problem: Skin/Tissue Integrity  Goal: Absence of new skin breakdown  Description: 1. Monitor for areas of redness and/or skin breakdown  2. Assess vascular access sites hourly  3. Every 4-6 hours minimum:  Change oxygen saturation probe site  4. Every 4-6 hours:  If on nasal continuous positive airway pressure, respiratory therapy assess nares and determine need for appliance change or resting period. Outcome: Progressing     Problem: Confusion  Goal: Confusion, delirium, dementia, or psychosis is improved or at baseline  Description: INTERVENTIONS:  1. Assess for possible contributors to thought disturbance, including medications, impaired vision or hearing, underlying metabolic abnormalities, dehydration, psychiatric diagnoses, and notify attending LIP  2. [place] high risk fall precautions, as indicated  3. Provide frequent short contacts to provide reality reorientation, refocusing and direction  4. Decrease environmental stimuli, including noise as appropriate  5. Monitor and intervene to maintain adequate nutrition, hydration, elimination, sleep and activity  6. If unable to ensure safety without constant attention obtain sitter and review sitter guidelines with assigned personnel  7.  Initiate Psychosocial CNS and Spiritual Care consult, as indicated  Outcome: Progressing    Problem: ABCDS Injury Assessment  Goal: Absence of physical injury  Outcome: Progressing     Problem: Safety - Adult  Goal: Free from fall injury  9/6/2022 1217 by Snow Hernandez RN  Outcome: Progressing

## 2022-09-06 NOTE — PLAN OF CARE
Problem: Safety - Adult  Goal: Free from fall injury  Outcome: Progressing  Flowsheets (Taken 9/5/2022 2301)  Free From Fall Injury:   Instruct family/caregiver on patient safety   Based on caregiver fall risk screen, instruct family/caregiver to ask for assistance with transferring infant if caregiver noted to have fall risk factors

## 2022-09-06 NOTE — DISCHARGE SUMMARY
Hospital Medicine Discharge Summary    Patient ID: Zeny Alexander      Patient's PCP: Acosta Mccauley, APRN - CNP    Admit Date: 9/4/2022     Discharge Date: 9/6/2022      Admitting Provider: Debria Soulier, MD     Discharge Provider: Valentina Pugh DO     Discharge Diagnoses: Active Hospital Problems    Diagnosis     Encephalopathy, metabolic [O36.13]      Priority: Medium    Dementia due to Alzheimer's disease ([facility] [address] [G30.9, F02.80]      Priority: Medium    Cellulitis [L28.09]      Priority: Medium    Prediabetes [R73.03]      Priority: Medium    Acute encephalopathy [G93.40]      Priority: Medium    Agitation [R45.1]      Priority: Medium    Borderline hyperglycemia [R73.9]      Priority: Medium    Urinary tract infection without hematuria [N39.0]      Priority: Medium    Hypertensive urgency, malignant [I16.0]      Priority: Medium    HTN (hypertension), benign [I10]     Vitamin D deficiency [E55.9]     Insulin resistance [E88.81]        The patient was seen and examined on day of discharge and this discharge summary is in conjunction with any daily progress note from day of discharge. Hospital Course:   81 yo female [facility] to [facility] for headaches and confusion. Found to have acute cystitis and umbilical cellulitis. Due to language deficit, CVA workup completed and MRI with no acute intracranial findings. Neurology consulted and felt that acute encephalopathy was secondary to metabolic encephalopathy from UTI. She was discharged with ASA and statin. Lisinopril was started to help improve BP control. Echo was deferred. 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  Recommend f/u with ENT       Physical Exam Performed:     /71   Pulse 70   Temp 97.5 °F (36.4 °C)   Resp 18   Ht 5' 4\" (1.626 m)   Wt 85 lb 14.4 oz (39 kg)   SpO2 100%   BMI 14.74 kg/m²       General appearance: No apparent distress, appears stated age and cooperative. Thin  HEENT: Pupils equal, round, and reactive to light. Conjunctivae/corneas clear. Neck: Supple, with full range of motion. No jugular venous distention. Trachea midline. Respiratory:  Normal respiratory effort. Clear to auscultation, bilaterally without Rales/Wheezes/Rhonchi. Cardiovascular: Regular rate and rhythm with normal S1/S2 without murmurs, rubs or gallops. Abdomen: Soft, non-tender, non-distended with normal bowel sounds. Musculoskeletal: No clubbing, cyanosis or edema bilaterally. Full range of motion without deformity. Skin: Skin color, texture, turgor normal.  No rashes or lesions. Umbilical region with mild erythema that is improving, dried serous drainage  Neurologic:  Neurovascularly intact without any focal sensory/motor deficits. Cranial nerves: II-XII intact, grossly non-focal. Except poor hearing   Psychiatric: Alert and oriented, thought content appropriate, normal insight  Capillary Refill: Brisk, 3 seconds, normal   Peripheral Pulses: +2 palpable, equal bilaterally       Labs: For convenience and continuity at follow-up the following most recent labs are provided:      CBC:    Lab Results   Component Value Date/Time    WBC 7.2 09/06/2022 07:13 AM    HGB 12.9 09/06/2022 07:13 AM    HCT 38.2 09/06/2022 07:13 AM     09/06/2022 07:13 AM       Renal:    Lab Results   Component Value Date/Time     09/06/2022 07:13 AM    K 3.3 09/06/2022 07:13 AM    K 3.2 09/05/2022 06:29 AM     09/06/2022 07:13 AM    CO2 26 09/06/2022 07:13 AM    BUN 15 09/06/2022 07:13 AM    CREATININE 0.6 09/06/2022 07:13 AM    CALCIUM 9.1 09/06/2022 07:13 AM    PHOS 4.0 02/05/2019 04:59 AM         Significant Diagnostic Studies    Radiology:   MRI BRAIN WO CONTRAST   Final Result   No acute intracranial findings.       Similar appearing, partially imaged left pre styloid parapharyngeal mass   measuring 3.3 x 1.9 cm when compared to MR brain from 08/01/2018 suggesting a   benign neoplasm. CT HEAD WO CONTRAST   Final Result   Atrophy and small vessel ischemic disease. XR CHEST PORTABLE   Final Result   Magnified cardiac silhouette without radiographic evidence of acute pulmonary   abnormality seen. Consults:     IP CONSULT TO SOCIAL WORK  IP CONSULT TO NEUROLOGY    Disposition:  Home     Condition at Discharge: Stable    Discharge Instructions/Follow-up:  BMP in 1 week     Code Status:  Prior     Activity: activity as tolerated    Diet: regular diet      Discharge Medications:     Discharge Medication List as of 9/6/2022  2:29 PM             Details   aspirin 81 MG EC tablet Take 1 tablet by mouth daily, Disp-30 tablet, R-3Normal      lisinopril (PRINIVIL;ZESTRIL) 5 MG tablet Take 1 tablet by mouth daily, Disp-30 tablet, R-3Normal      cephALEXin (KEFLEX) 500 MG capsule Take 1 capsule by mouth 4 times daily for 4 days, Disp-16 capsule, R-0Normal                Details   naproxen (NAPROSYN) 250 MG tablet Take 1 tablet by mouth 2 times daily, Disp-10 tablet, R-0Normal      lidocaine (LIDODERM) 5 % Place 1 patch onto the skin daily 12 hours on, 12 hours off., Disp-30 patch, R-0Normal      acetaminophen (TYLENOL) 500 MG tablet Take 500 mg by mouth every 6 hours as needed for PainHistorical Med      gabapentin (NEURONTIN) 300 MG capsule Take 200 mg by mouth 2 times daily. Wade Jassoe Historical Med      atenolol (TENORMIN) 50 MG tablet Take 50 mg by mouth daily. Historical Med             Time Spent on discharge is more than 45 minutes in the examination, evaluation, counseling and review of medications and discharge plan. Signed:    Jeannette Nelson DO    9/6/2022      Thank you KANIKA ALVA APRN - TREMAINE for the opportunity to be involved in this patient's care.  If you have any questions or concerns, please feel free to contact me at [phone].

## 2022-09-06 NOTE — PROGRESS NOTES
Pt d/c'd home. Removed peripheral IV and stopped bleeding. Catheter intact. Pt tolerated well. No redness noted at site. Notified CMU and removed tele box. Reviewed d/c instructions with patient and daughter-in-law, home meds, and  f/u information utilizing teach-back method. Scripts sent to [employer] and reviewed with patient. Patient verbalized understanding. RN transported pt. To personal vehicle.  Electronically signed by Nuria Andrew RN on 9/6/22 at 3:06 PM EDT

## 2022-09-06 NOTE — PROGRESS NOTES
Physician Progress Note      PATIENTLananshu Blue  CSN #:                  [id number]  :                       1935  ADMIT DATE:       2022 12:57 PM  [address] DATE:        2022 3:07 PM  RESPONDING  PROVIDER #:        Matias Hyde DO          QUERY TEXT:    Pt admitted with \" Acute encephalopathy with language deficit-Possibly due to   acute cystitis. \" Neurology PN- - \"Acute encephalopathy secondary to   metabolic encephalopathy-UTI\"   If possible, please document in progress notes   and discharge summary:    The medical record reflects the following:  Risk Factors: UTI  Clinical Indicators:  Neurology PN -\"The patient is awake and alert today. Hard of hearing but able to answer questions but appeared his baseline. MRI showed no acute stroke and chronic parapharyngeal mass- Acute cystitis   -Patient with 3+ bacteria but without RBCs, LE, or nitrite\". Treatment: Neurology consult, MRI- Following collection of cultures, patient   initiated on IV Rocephin 1 g daily, Continue current supportive care, PT, OT   and speech, Aspirin, Statin  Continue current blood pressure medications and blood pressure control,   Telemetry    Thank-You, Lianne Cassdiy RN, BSN, CCDS  Options provided:  -- Acute Metabolic encephalopathy due to UTI confirmed present on admission  -- Other - I will add my own diagnosis  -- Disagree - Not applicable / Not valid  -- Disagree - Clinically unable to determine / Unknown  -- Refer to Clinical Documentation Reviewer    PROVIDER RESPONSE TEXT:    The diagnosis of Acute Metabolic encephalopathy due to UTI was confirmed as   present on admission.     Query created by: uArea Jones on 2022 2:34 PM      Electronically signed by:  Matias Hyde DO 2022 4:44 PM

## 2022-09-06 NOTE — PROGRESS NOTES
Dr. Thomas Barrientos perfect served \"Patient has not recieved echo. Are you okay with patient discharging without echo? Please advise. Thank you! \"  Awaiting response. Electronically signed by Frank Godfrey RN on 9/6/22 at 1:48 PM EDT     Per Dr. Thomas Barrientos and Ishaan Mera NP with Neuro pt. Can discharge without Echo.  Electronically signed by Frank Godfrey RN on 9/6/22 at 2:29 PM EDT

## 2024-03-31 ENCOUNTER — APPOINTMENT (OUTPATIENT)
Dept: CT IMAGING | Age: 89
DRG: 689 | End: 2024-03-31
Payer: MEDICARE

## 2024-03-31 ENCOUNTER — APPOINTMENT (OUTPATIENT)
Dept: GENERAL RADIOLOGY | Age: 89
DRG: 689 | End: 2024-03-31
Payer: MEDICARE

## 2024-03-31 ENCOUNTER — HOSPITAL ENCOUNTER (INPATIENT)
Age: 89
LOS: 4 days | Discharge: HOME | DRG: 689 | End: 2024-04-04
Attending: EMERGENCY MEDICINE | Admitting: INTERNAL MEDICINE
Payer: MEDICARE

## 2024-03-31 DIAGNOSIS — R26.81 UNSTEADY GAIT: ICD-10-CM

## 2024-03-31 DIAGNOSIS — U07.1 COVID: ICD-10-CM

## 2024-03-31 DIAGNOSIS — N30.00 ACUTE CYSTITIS WITHOUT HEMATURIA: Primary | ICD-10-CM

## 2024-03-31 DIAGNOSIS — G93.40 ENCEPHALOPATHY: ICD-10-CM

## 2024-03-31 LAB
ALBUMIN SERPL-MCNC: 4 G/DL (ref 3.4–5)
ALBUMIN/GLOB SERPL: 1.1 {RATIO} (ref 1.1–2.2)
ALP SERPL-CCNC: 83 U/L (ref 40–129)
ALT SERPL-CCNC: 10 U/L (ref 10–40)
ANION GAP SERPL CALCULATED.3IONS-SCNC: 15 MMOL/L (ref 3–16)
AST SERPL-CCNC: 24 U/L (ref 15–37)
BACTERIA URNS QL MICRO: ABNORMAL /HPF
BASOPHILS # BLD: 0.1 K/UL (ref 0–0.2)
BASOPHILS NFR BLD: 0.8 %
BILIRUB SERPL-MCNC: 1 MG/DL (ref 0–1)
BILIRUB UR QL STRIP.AUTO: NEGATIVE
BUN SERPL-MCNC: 18 MG/DL (ref 7–20)
CALCIUM SERPL-MCNC: 9.5 MG/DL (ref 8.3–10.6)
CHLORIDE SERPL-SCNC: 95 MMOL/L (ref 99–110)
CLARITY UR: ABNORMAL
CO2 SERPL-SCNC: 21 MMOL/L (ref 21–32)
COLOR UR: YELLOW
CREAT SERPL-MCNC: 0.7 MG/DL (ref 0.6–1.2)
DEPRECATED RDW RBC AUTO: 12.8 % (ref 12.4–15.4)
EOSINOPHIL # BLD: 0 K/UL (ref 0–0.6)
EOSINOPHIL NFR BLD: 0.1 %
FLUAV RNA RESP QL NAA+PROBE: NOT DETECTED
FLUBV RNA RESP QL NAA+PROBE: NOT DETECTED
GFR SERPLBLD CREATININE-BSD FMLA CKD-EPI: 84 ML/MIN/{1.73_M2}
GLUCOSE SERPL-MCNC: 119 MG/DL (ref 70–99)
GLUCOSE UR STRIP.AUTO-MCNC: NEGATIVE MG/DL
HCT VFR BLD AUTO: 40.1 % (ref 36–48)
HGB BLD-MCNC: 13.3 G/DL (ref 12–16)
HGB UR QL STRIP.AUTO: ABNORMAL
KETONES UR STRIP.AUTO-MCNC: ABNORMAL MG/DL
LEUKOCYTE ESTERASE UR QL STRIP.AUTO: ABNORMAL
LYMPHOCYTES # BLD: 2.7 K/UL (ref 1–5.1)
LYMPHOCYTES NFR BLD: 20.5 %
MCH RBC QN AUTO: 31 PG (ref 26–34)
MCHC RBC AUTO-ENTMCNC: 33.2 G/DL (ref 31–36)
MCV RBC AUTO: 93.5 FL (ref 80–100)
MONOCYTES # BLD: 1.2 K/UL (ref 0–1.3)
MONOCYTES NFR BLD: 8.9 %
NEUTROPHILS # BLD: 9.2 K/UL (ref 1.7–7.7)
NEUTROPHILS NFR BLD: 69.7 %
NITRITE UR QL STRIP.AUTO: NEGATIVE
PH UR STRIP.AUTO: 6 [PH] (ref 5–8)
PLATELET # BLD AUTO: 266 K/UL (ref 135–450)
PMV BLD AUTO: 7.3 FL (ref 5–10.5)
POTASSIUM SERPL-SCNC: 3.9 MMOL/L (ref 3.5–5.1)
PROT SERPL-MCNC: 7.7 G/DL (ref 6.4–8.2)
PROT UR STRIP.AUTO-MCNC: 100 MG/DL
RBC # BLD AUTO: 4.29 M/UL (ref 4–5.2)
RBC #/AREA URNS HPF: ABNORMAL /HPF (ref 0–4)
SARS-COV-2 RNA RESP QL NAA+PROBE: DETECTED
SODIUM SERPL-SCNC: 131 MMOL/L (ref 136–145)
SP GR UR STRIP.AUTO: 1.02 (ref 1–1.03)
UA COMPLETE W REFLEX CULTURE PNL UR: YES
UA DIPSTICK W REFLEX MICRO PNL UR: YES
URN SPEC COLLECT METH UR: ABNORMAL
UROBILINOGEN UR STRIP-ACNC: 0.2 E.U./DL
WBC # BLD AUTO: 13.2 K/UL (ref 4–11)
WBC #/AREA URNS HPF: ABNORMAL /HPF (ref 0–5)

## 2024-03-31 PROCEDURE — 1200000000 HC SEMI PRIVATE

## 2024-03-31 PROCEDURE — 6360000002 HC RX W HCPCS: Performed by: EMERGENCY MEDICINE

## 2024-03-31 PROCEDURE — 96365 THER/PROPH/DIAG IV INF INIT: CPT

## 2024-03-31 PROCEDURE — 81001 URINALYSIS AUTO W/SCOPE: CPT

## 2024-03-31 PROCEDURE — 80053 COMPREHEN METABOLIC PANEL: CPT

## 2024-03-31 PROCEDURE — 87086 URINE CULTURE/COLONY COUNT: CPT

## 2024-03-31 PROCEDURE — 2580000003 HC RX 258: Performed by: EMERGENCY MEDICINE

## 2024-03-31 PROCEDURE — 70450 CT HEAD/BRAIN W/O DYE: CPT

## 2024-03-31 PROCEDURE — 87077 CULTURE AEROBIC IDENTIFY: CPT

## 2024-03-31 PROCEDURE — 85025 COMPLETE CBC W/AUTO DIFF WBC: CPT

## 2024-03-31 PROCEDURE — 99285 EMERGENCY DEPT VISIT HI MDM: CPT

## 2024-03-31 PROCEDURE — 87636 SARSCOV2 & INF A&B AMP PRB: CPT

## 2024-03-31 PROCEDURE — 71045 X-RAY EXAM CHEST 1 VIEW: CPT

## 2024-03-31 PROCEDURE — 93005 ELECTROCARDIOGRAM TRACING: CPT | Performed by: EMERGENCY MEDICINE

## 2024-03-31 RX ORDER — ASPIRIN 81 MG/1
81 TABLET ORAL DAILY
Status: DISCONTINUED | OUTPATIENT
Start: 2024-04-01 | End: 2024-04-04 | Stop reason: HOSPADM

## 2024-03-31 RX ORDER — POLYETHYLENE GLYCOL 3350 17 G/17G
17 POWDER, FOR SOLUTION ORAL DAILY PRN
Status: DISCONTINUED | OUTPATIENT
Start: 2024-03-31 | End: 2024-04-04 | Stop reason: HOSPADM

## 2024-03-31 RX ORDER — GABAPENTIN 100 MG/1
200 CAPSULE ORAL 2 TIMES DAILY
Status: DISCONTINUED | OUTPATIENT
Start: 2024-03-31 | End: 2024-04-01

## 2024-03-31 RX ORDER — SODIUM CHLORIDE 0.9 % (FLUSH) 0.9 %
5-40 SYRINGE (ML) INJECTION EVERY 12 HOURS SCHEDULED
Status: DISCONTINUED | OUTPATIENT
Start: 2024-03-31 | End: 2024-04-04 | Stop reason: HOSPADM

## 2024-03-31 RX ORDER — ENOXAPARIN SODIUM 100 MG/ML
30 INJECTION SUBCUTANEOUS DAILY
Status: DISCONTINUED | OUTPATIENT
Start: 2024-04-01 | End: 2024-04-04 | Stop reason: HOSPADM

## 2024-03-31 RX ORDER — ACETAMINOPHEN 325 MG/1
650 TABLET ORAL EVERY 6 HOURS PRN
Status: DISCONTINUED | OUTPATIENT
Start: 2024-03-31 | End: 2024-04-04 | Stop reason: HOSPADM

## 2024-03-31 RX ORDER — ONDANSETRON 2 MG/ML
4 INJECTION INTRAMUSCULAR; INTRAVENOUS EVERY 6 HOURS PRN
Status: DISCONTINUED | OUTPATIENT
Start: 2024-03-31 | End: 2024-04-04 | Stop reason: HOSPADM

## 2024-03-31 RX ORDER — ONDANSETRON 4 MG/1
4 TABLET, ORALLY DISINTEGRATING ORAL EVERY 8 HOURS PRN
Status: DISCONTINUED | OUTPATIENT
Start: 2024-03-31 | End: 2024-04-04 | Stop reason: HOSPADM

## 2024-03-31 RX ORDER — LISINOPRIL 5 MG/1
5 TABLET ORAL DAILY
Status: DISCONTINUED | OUTPATIENT
Start: 2024-04-01 | End: 2024-04-02 | Stop reason: ALTCHOICE

## 2024-03-31 RX ORDER — ACETAMINOPHEN 650 MG/1
650 SUPPOSITORY RECTAL EVERY 6 HOURS PRN
Status: DISCONTINUED | OUTPATIENT
Start: 2024-03-31 | End: 2024-04-04 | Stop reason: HOSPADM

## 2024-03-31 RX ORDER — GUAIFENESIN/DEXTROMETHORPHAN 100-10MG/5
5 SYRUP ORAL EVERY 4 HOURS PRN
Status: DISCONTINUED | OUTPATIENT
Start: 2024-03-31 | End: 2024-04-04 | Stop reason: HOSPADM

## 2024-03-31 RX ORDER — SODIUM CHLORIDE 9 MG/ML
INJECTION, SOLUTION INTRAVENOUS ONCE
Status: DISCONTINUED | OUTPATIENT
Start: 2024-03-31 | End: 2024-04-04 | Stop reason: HOSPADM

## 2024-03-31 RX ORDER — SODIUM CHLORIDE 0.9 % (FLUSH) 0.9 %
5-40 SYRINGE (ML) INJECTION PRN
Status: DISCONTINUED | OUTPATIENT
Start: 2024-03-31 | End: 2024-04-04 | Stop reason: HOSPADM

## 2024-03-31 RX ORDER — SODIUM CHLORIDE 9 MG/ML
INJECTION, SOLUTION INTRAVENOUS PRN
Status: DISCONTINUED | OUTPATIENT
Start: 2024-03-31 | End: 2024-04-04 | Stop reason: HOSPADM

## 2024-03-31 RX ORDER — SODIUM CHLORIDE 9 MG/ML
1000 INJECTION, SOLUTION INTRAVENOUS CONTINUOUS
Status: DISCONTINUED | OUTPATIENT
Start: 2024-03-31 | End: 2024-04-03

## 2024-03-31 RX ORDER — ATENOLOL 25 MG/1
50 TABLET ORAL DAILY
Status: DISCONTINUED | OUTPATIENT
Start: 2024-04-01 | End: 2024-04-04 | Stop reason: HOSPADM

## 2024-03-31 RX ADMIN — SODIUM CHLORIDE 1000 ML: 9 INJECTION, SOLUTION INTRAVENOUS at 22:07

## 2024-03-31 RX ADMIN — CEFTRIAXONE SODIUM 1000 MG: 1 INJECTION, POWDER, FOR SOLUTION INTRAMUSCULAR; INTRAVENOUS at 20:04

## 2024-03-31 ASSESSMENT — PAIN SCALES - GENERAL
PAINLEVEL_OUTOF10: 0
PAINLEVEL_OUTOF10: 0

## 2024-03-31 ASSESSMENT — PAIN - FUNCTIONAL ASSESSMENT: PAIN_FUNCTIONAL_ASSESSMENT: 0-10

## 2024-03-31 NOTE — ED PROVIDER NOTES
Emergency Department Provider Note  Location: [facility]  ED  3/31/2024     Patient Identification  Ros Min is a 88 y.o. female    Chief Complaint  Fall, Altered Mental Status, and Positive For Covid-19 (Son reports last night pt was feeling unwell. Today at the [facility] nurses tested her for a Covid and believes she may have a UTI, and has been off balance. Son states she has intermittent memory issues.  )          HPI  (History provided by patient and EMS)  Patient is an 88-year-old female with history of hypertension hyperlipidemia who presents for concern for unsteady gait, questionable witnessed fall, questionable confusion, and concern for UTI.  Patient also happened to test positive for COVID today.  Patient is oriented to person and place.  She reports she has had a cough for the past 2 days.  She reports they swabbed her and she tested positive for COVID.  She is concerned she might have a urinary tract infection.  Reports dysuria and frequency.  Denies abdominal pain.  She reports that she fell yesterday but did not injure herself.  Unsure exactly why she fell believes she tripped but denies any lightheadedness.    Nurse discussed with patient's son who reports that he was told that his mother had an unsteady gait nursing facility.  Son reported patient has \"intermittent memory issues\" however I see no documentation of this.      Nursing Notes were all reviewed and agreed with, or any disagreements were addressed in the HPI:  Allergies:   Allergies   Allergen Reactions    Erythromycin      cough       Past medical history:  has a past medical history of Cancer (HCC), Hyperlipidemia, Hypertension, Trigeminal neuralgia, and Trigeminal neuralgia (7/19/2018).    Past surgical history:  has a past surgical history that includes ERCP (N/A, 2/4/2019); ERCP (2/4/2019); ERCP (2/4/2019); Mastectomy (Left); and Cholecystectomy, laparoscopic (N/A, 2/19/2019).    Home medications:

## 2024-04-01 LAB
ANION GAP SERPL CALCULATED.3IONS-SCNC: 15 MMOL/L (ref 3–16)
BACTERIA UR CULT: ABNORMAL
BASOPHILS # BLD: 0.1 K/UL (ref 0–0.2)
BASOPHILS NFR BLD: 0.9 %
BUN SERPL-MCNC: 17 MG/DL (ref 7–20)
CALCIUM SERPL-MCNC: 9 MG/DL (ref 8.3–10.6)
CHLORIDE SERPL-SCNC: 99 MMOL/L (ref 99–110)
CK SERPL-CCNC: 149 U/L (ref 26–192)
CO2 SERPL-SCNC: 18 MMOL/L (ref 21–32)
CREAT SERPL-MCNC: 0.7 MG/DL (ref 0.6–1.2)
DEPRECATED RDW RBC AUTO: 13.1 % (ref 12.4–15.4)
EKG ATRIAL RATE: 60 BPM
EKG DIAGNOSIS: NORMAL
EKG P AXIS: 79 DEGREES
EKG P-R INTERVAL: 216 MS
EKG Q-T INTERVAL: 442 MS
EKG QRS DURATION: 128 MS
EKG QTC CALCULATION (BAZETT): 442 MS
EKG R AXIS: 67 DEGREES
EKG T AXIS: -19 DEGREES
EKG VENTRICULAR RATE: 60 BPM
EOSINOPHIL # BLD: 0.1 K/UL (ref 0–0.6)
EOSINOPHIL NFR BLD: 1.4 %
GFR SERPLBLD CREATININE-BSD FMLA CKD-EPI: 84 ML/MIN/{1.73_M2}
GLUCOSE SERPL-MCNC: 100 MG/DL (ref 70–99)
HCT VFR BLD AUTO: 37.2 % (ref 36–48)
HGB BLD-MCNC: 12.4 G/DL (ref 12–16)
LYMPHOCYTES # BLD: 1.7 K/UL (ref 1–5.1)
LYMPHOCYTES NFR BLD: 19 %
MCH RBC QN AUTO: 31.6 PG (ref 26–34)
MCHC RBC AUTO-ENTMCNC: 33.5 G/DL (ref 31–36)
MCV RBC AUTO: 94.3 FL (ref 80–100)
MONOCYTES # BLD: 1.1 K/UL (ref 0–1.3)
MONOCYTES NFR BLD: 12.3 %
NEUTROPHILS # BLD: 5.8 K/UL (ref 1.7–7.7)
NEUTROPHILS NFR BLD: 66.4 %
ORGANISM: ABNORMAL
PLATELET # BLD AUTO: 227 K/UL (ref 135–450)
PMV BLD AUTO: 7.2 FL (ref 5–10.5)
POTASSIUM SERPL-SCNC: 3.6 MMOL/L (ref 3.5–5.1)
RBC # BLD AUTO: 3.94 M/UL (ref 4–5.2)
SODIUM SERPL-SCNC: 132 MMOL/L (ref 136–145)
WBC # BLD AUTO: 8.8 K/UL (ref 4–11)

## 2024-04-01 PROCEDURE — 97162 PT EVAL MOD COMPLEX 30 MIN: CPT

## 2024-04-01 PROCEDURE — 6370000000 HC RX 637 (ALT 250 FOR IP): Performed by: NURSE PRACTITIONER

## 2024-04-01 PROCEDURE — 97116 GAIT TRAINING THERAPY: CPT

## 2024-04-01 PROCEDURE — 97110 THERAPEUTIC EXERCISES: CPT

## 2024-04-01 PROCEDURE — 36415 COLL VENOUS BLD VENIPUNCTURE: CPT

## 2024-04-01 PROCEDURE — 97530 THERAPEUTIC ACTIVITIES: CPT

## 2024-04-01 PROCEDURE — 93010 ELECTROCARDIOGRAM REPORT: CPT | Performed by: INTERNAL MEDICINE

## 2024-04-01 PROCEDURE — 85025 COMPLETE CBC W/AUTO DIFF WBC: CPT

## 2024-04-01 PROCEDURE — 6360000002 HC RX W HCPCS: Performed by: INTERNAL MEDICINE

## 2024-04-01 PROCEDURE — 2580000003 HC RX 258: Performed by: NURSE PRACTITIONER

## 2024-04-01 PROCEDURE — 2580000003 HC RX 258: Performed by: EMERGENCY MEDICINE

## 2024-04-01 PROCEDURE — 82550 ASSAY OF CK (CPK): CPT

## 2024-04-01 PROCEDURE — 97535 SELF CARE MNGMENT TRAINING: CPT

## 2024-04-01 PROCEDURE — 2580000003 HC RX 258: Performed by: INTERNAL MEDICINE

## 2024-04-01 PROCEDURE — 80048 BASIC METABOLIC PNL TOTAL CA: CPT

## 2024-04-01 PROCEDURE — 97166 OT EVAL MOD COMPLEX 45 MIN: CPT

## 2024-04-01 PROCEDURE — 1200000000 HC SEMI PRIVATE

## 2024-04-01 PROCEDURE — 6360000002 HC RX W HCPCS: Performed by: NURSE PRACTITIONER

## 2024-04-01 RX ORDER — OLMESARTAN MEDOXOMIL 20 MG/1
20 TABLET ORAL DAILY
Status: ON HOLD | COMMUNITY
End: 2024-04-02 | Stop reason: CLARIF

## 2024-04-01 RX ORDER — SERTRALINE HYDROCHLORIDE 25 MG/1
25 TABLET, FILM COATED ORAL NIGHTLY
COMMUNITY

## 2024-04-01 RX ADMIN — SODIUM CHLORIDE 1000 ML: 9 INJECTION, SOLUTION INTRAVENOUS at 17:41

## 2024-04-01 RX ADMIN — SODIUM CHLORIDE, PRESERVATIVE FREE 10 ML: 5 INJECTION INTRAVENOUS at 10:44

## 2024-04-01 RX ADMIN — SODIUM CHLORIDE 1000 ML: 9 INJECTION, SOLUTION INTRAVENOUS at 07:34

## 2024-04-01 RX ADMIN — GUAIFENESIN AND DEXTROMETHORPHAN 5 ML: 100; 10 SYRUP ORAL at 22:17

## 2024-04-01 RX ADMIN — ASPIRIN 81 MG: 81 TABLET, COATED ORAL at 10:40

## 2024-04-01 RX ADMIN — ENOXAPARIN SODIUM 30 MG: 100 INJECTION SUBCUTANEOUS at 10:41

## 2024-04-01 RX ADMIN — ATENOLOL 50 MG: 25 TABLET ORAL at 10:40

## 2024-04-01 RX ADMIN — CEFTRIAXONE SODIUM 1000 MG: 1 INJECTION, POWDER, FOR SOLUTION INTRAMUSCULAR; INTRAVENOUS at 20:29

## 2024-04-01 RX ADMIN — SODIUM CHLORIDE, PRESERVATIVE FREE 10 ML: 5 INJECTION INTRAVENOUS at 00:34

## 2024-04-01 RX ADMIN — SODIUM CHLORIDE, PRESERVATIVE FREE 10 ML: 5 INJECTION INTRAVENOUS at 20:30

## 2024-04-01 NOTE — PROGRESS NOTES
Gait training, Safety education & training, Home exercise program, Therapeutic activities, Patient/Caregiver education & training  Safety Devices  Type of Devices: Chair alarm in place, Call light within reach, Left in chair, Gait belt, Nurse notified  Restraints  Restraints Initially in Place: No     Restrictions  Restrictions/Precautions  Restrictions/Precautions: Up as Tolerated, Isolation, Fall Risk  Required Braces or Orthoses?: No  Position Activity Restriction  Other position/activity restrictions: Droplet Plus, IV     Subjective   General  Chart Reviewed: Yes  Patient assessed for rehabilitation services?: Yes  Response To Previous Treatment: Not applicable  Family / Caregiver Present: No  Referring Practitioner: Milind Pritchett APRN - CNP  Referral Date : 03/31/24  Diagnosis: Acute infective cystitis  Follows Commands: Within Functional Limits  General Comment  Comments: RN cleared for PT eval  Subjective  Subjective: Pt semi-supine in bed upon arrival, agreeable to PT tx         Social/Functional History  Social/Functional History  Type of Home:  ([facility])  Home Layout: One level  Bathroom Shower/Tub:  (sponge bath)  Bathroom Toilet: Standard  Home Equipment: Walker, standard, Wheelchair-manual  Has the patient had two or more falls in the past year or any fall with injury in the past year?: Yes (fall that lead to admission)  ADL Assistance: Independent (pt reports sponge bath, pt reports able to go to the toilet IND and get dressed IND)  Homemaking Responsibilities: No  Ambulation Assistance: Independent  Transfer Assistance: Independent  Active : No  Occupation: Retired  Additional Comments: Pt limited A&O x2, may benefit from verification of information    Vision/Hearing  Vision  Vision: Impaired  Vision Exceptions: Wears glasses at all times  Hearing  Hearing: Exceptions to WFL  Hearing Exceptions: Hard of hearing/hearing concerns;Left hearing aid      Cognition  Widened  Gait Abnormalities: Path deviations;Decreased step clearance;Trunk sway increased    Exercise Treatment: Pt perform seated DF, PF, LAQ, marches Bx10    AM-PAC - Mobility    AM-PAC Basic Mobility - Inpatient   How much help is needed turning from your back to your side while in a flat bed without using bedrails?: A Little  How much help is needed moving from lying on your back to sitting on the side of a flat bed without using bedrails?: A Little  How much help is needed moving to and from a bed to a chair?: A Little  How much help is needed standing up from a chair using your arms?: A Little  How much help is needed walking in hospital room?: A Little  How much help is needed climbing 3-5 steps with a railing?: A Little  AM-[facility] Inpatient Mobility Raw Score : 18  AM-PAC Inpatient T-Scale Score : 43.63  Mobility Inpatient CMS 0-100% Score: 46.58  Mobility Inpatient CMS G-Code Modifier : CK    Goals  Short Term Goals  Time Frame for Short Term Goals: 7 days (4/8/24) unless otherwise noted  Short Term Goal 1: Pt will perform bed mobility with mod I  Short Term Goal 2: Pt will perform transfers with no AD and supervision  Short Term Goal 3: Pt will ambulate 50 ft with no AD and supervision and no LOB  Short Term Goal 4: Pt will perform 12-15 reps of BLE exercises by 4/4/24  Patient Goals   Patient Goals : \"to go home\"       Education  Patient Education  Education Given To: Patient  Education Provided: Role of Therapy;Plan of Care;Fall Prevention Strategies;Transfer Training  Education Method: Verbal  Barriers to Learning: Cognition  Education Outcome: Verbalized understanding;Demonstrated understanding      Therapy Time   Individual Concurrent Group Co-treatment   Time In       0910   Time Out       0945   Minutes       35   Timed Code Treatment Minutes: 25 Minutes (10 min eval)     If pt is unable to be seen after this session, please let this note serve as discharge summary.  Please see case management note

## 2024-04-01 NOTE — ED NOTES
Ms. Min is a 88 y.o. female who had concerns including Fall, Altered Mental Status, and Positive For Covid-19 (Son reports last night pt was feeling unwell. Today at the [facility] nurses tested her for a Covid and believes she may have a UTI, and has been off balance. Son states she has intermittent memory issues.  ).    Chief Complaint   Patient presents with    Fall    Altered Mental Status    Positive For Covid-19     Son reports last night pt was feeling unwell. Today at the [facility] nurses tested her for a Covid and believes she may have a UTI, and has been off balance. Son states she has intermittent memory issues.         She is being admitted for:    <principal problem not specified>    Her ED problem list included:    1. Acute cystitis without hematuria    2. COVID    3. Unsteady gait    4. Encephalopathy        Past Medical History:   Diagnosis Date    Cancer (HCC)     breast    Hyperlipidemia     Hypertension     Trigeminal neuralgia     Trigeminal neuralgia 7/19/2018       Past Surgical History:   Procedure Laterality Date    CHOLECYSTECTOMY, LAPAROSCOPIC N/A 2/19/2019    LAPAROSCOPIC CHOLECYSTECTOMY WITH INTRAOPERATIVE CHOLANGIOGRAM performed by Isidro Ly MD at [facility] OR    ERCP N/A 2/4/2019    ERCP ENDOSCOPIC RETROGRADE CHOLANGIOPANCREATOGRAPHY performed by Mario Eli MD at [facility] OR    ERCP  2/4/2019    ERCP SPHINCTER/PAPILLOTOMY performed by Mario Eli MD at [facility] OR    ERCP  2/4/2019    ERCP DILATION BALLOON performed by Mario Eli MD at [facility] OR    MASTECTOMY Left     1976       Her recent abnormal labs were:    Labs Reviewed   CBC WITH AUTO DIFFERENTIAL - Abnormal; Notable for the following components:       Result Value    WBC 13.2 (*)     Neutrophils Absolute 9.2 (*)     All other components within normal limits   COMPREHENSIVE METABOLIC PANEL W/ REFLEX TO MG FOR LOW K - Abnormal; Notable for the following components:    Sodium 131 (*)      PROVIDED HISTORY: confusion TECHNOLOGIST PROVIDED HISTORY: Reason for exam:->confusion Has a \"code stroke\" or \"stroke alert\" been called?->No Decision Support Exception - unselect if not a suspected or confirmed emergency medical condition->Emergency Medical Condition (MA) Reason for Exam: confusion,recent fall,Covid positive FINDINGS: There is motion artifact throughout the exam. BRAIN/VENTRICLES: The ventricles are mildly enlarged and there is diffuse mild prominence of the cortical sulci which is unchanged.  There is moderate periventricular low density bilaterally which is unchanged.  No intracranial hemorrhage or edema is seen.  There is no extra-axial fluid collection or mass. ORBITS: The visualized portion of the orbits demonstrate no acute abnormality. SINUSES: There is mild mucosal thickening throughout the ethmoid and visualized maxillary sinuses which is more prominent.  There has been a partial mastoidectomy on the right which is unchanged. SOFT TISSUES/SKULL:  No acute abnormality of the visualized skull or soft tissues.     Mild motion artifact limiting the exam.  Within the limitations of the exam, no obvious acute intracranial abnormality can be seen. Diffuse mild atrophy and moderate chronic microischemic disease in the deep white matter which is unchanged Chronic sinusitis which is more prominent.     XR CHEST PORTABLE    Result Date: 3/31/2024  EXAMINATION: ONE XRAY VIEW OF THE CHEST 3/31/2024 5:53 pm COMPARISON: Chest radiograph 09/04/2022 HISTORY: ORDERING SYSTEM PROVIDED HISTORY: cough TECHNOLOGIST PROVIDED HISTORY: Reason for exam:->cough Reason for Exam: cough FINDINGS: Lungs: Nodular density within the left mid lung.  Prominent interstitial markings. Pleura: No effusion or pneumothorax. Cardiomediastinal silhouette: Enlarged cardiac silhouette. Bones: No acute osseous findings. Soft tissues: Normal.     Nodular density within the left mid lung.  Recommend further characterization with CT

## 2024-04-01 NOTE — PROGRESS NOTES
..4 Eyes Skin Assessment     The patient is being assess for  Admission    I agree that 2 RN's have performed a thorough Head to Toe Skin Assessment on the patient. ALL assessment sites listed below have been assessed.       Areas assessed by both nurses: TODD Fung/ TODD Lirado  [x]   Head, Face, and Ears   [x]   Shoulders, Back, and Chest  [x]   Arms, Elbows, and Hands   [x]   Coccyx, Sacrum, and IschIum  [x]   Legs, Feet, and Heels        Does the Patient have Skin Breakdown?  No         Shayne Prevention initiated:  Yes   Wound Care Orders initiated:  No      C nurse consulted for Pressure Injury (Stage 3,4, Unstageable, DTI, NWPT, and Complex wounds), New and Established Ostomies:  No    Nurse 1 eSignature: Electronically signed by Kenton Pichardo RN on 4/1/24 at 2:18 AM EDT    **SHARE this note so that the co-signing nurse is able to place an eSignature**    Nurse 2 eSignature: {Esignature:[id number]}

## 2024-04-01 NOTE — PLAN OF CARE
Problem: Pain  Goal: Verbalizes/displays adequate comfort level or baseline comfort level  Outcome: Progressing  Flowsheets (Taken 4/1/2024 0215)  Verbalizes/displays adequate comfort level or baseline comfort level:   Encourage patient to monitor pain and request assistance   Assess pain using appropriate pain scale   Administer analgesics based on type and severity of pain and evaluate response     Problem: Skin/Tissue Integrity  Goal: Absence of new skin breakdown  Description: 1.  Monitor for areas of redness and/or skin breakdown  2.  Assess vascular access sites hourly  3.  Every 4-6 hours minimum:  Change oxygen saturation probe site  4.  Every 4-6 hours:  If on nasal continuous positive airway pressure, respiratory therapy assess nares and determine need for appliance change or resting period.  Outcome: Progressing     Problem: ABCDS Injury Assessment  Goal: Absence of physical injury  Outcome: Progressing  Flowsheets (Taken 4/1/2024 0215)  Absence of Physical Injury: Implement safety measures based on patient assessment     Problem: Safety - Adult  Goal: Free from fall injury  Outcome: Progressing

## 2024-04-01 NOTE — PROGRESS NOTES
Patient admitted to C3 room 350 from ED. Transported by staff via stretcher. Belongings at bedside. Patient oriented to room,POC and call light use. 4 eye skin assessment done with Nurse Cisneros. Admission assessment complete as charted. Medications administered per MAR. Nonskid footware on. Bed alarm on. Bed in low position, wheels locked, SR x2, call light and bedside table within reach. Home medication list not updated, patient not familiar will call son to verify.    -A/O X3, VSS.  -ambulates with walker.  -tolerating PO intake well  -denies further needs at this time.

## 2024-04-01 NOTE — CARE COORDINATION
Case Management Assessment  Initial Evaluation    Date/Time of Evaluation: 4/1/2024 12:17 PM  Assessment Completed by: Elissa Boles RN    If patient is discharged prior to next notation, then this note serves as note for discharge by case management.    Patient Name: Ros Min                   YOB: 1935  Diagnosis: Unsteady gait [R26.81]  Encephalopathy [G93.40]  Acute cystitis without hematuria [N30.00]  Acute infective cystitis [N30.00]  COVID [U07.1]                   Date / Time: 3/31/2024  5:30 PM    Patient Admission Status: Inpatient   Readmission Risk (Low < 19, Mod (19-27), High > 27): Readmission Risk Score: 11.3    Current PCP: Catia Wise APRN - CNP  PCP verified by CM?      Chart Reviewed: Yes      History Provided by: Patient, Medical Record  Patient Orientation: Alert and Oriented    Patient Cognition: Alert    Hospitalization in the last 30 days (Readmission):  No    If yes, Readmission Assessment in  Navigator will be completed.    Advance Directives:      Code Status: DNR-CCA   Patient's Primary Decision Maker is: Legal Next of Kin    Primary Decision Maker: Alexis Min - Spouse - [phone]    Secondary Decision Maker: Will Zamarripa - Child - [phone]    Discharge Planning:    Patient lives with: Alone Type of Home: Apartment, Assisted living  Primary Care Giver: Self  Patient Support Systems include: Children   Current Financial resources: Medicare, Other (Comment) (AAR)  Current community resources: Other (Comment) ([facility])  Current services prior to admission: None            Current DME:              Type of Home Care services:  None    ADLS  Prior functional level: Independent in ADLs/IADLs, Assistance with the following:, Housework, Shopping, Cooking (All provided by [facility]; [facility])  Current functional level: Independent in ADLs/IADLs, Assistance with the following:, Cooking, Housework, Shopping,

## 2024-04-01 NOTE — PROGRESS NOTES
Hospital Medicine Progress Note      Date of Admission: 3/31/2024  Hospital Day: 2    Chief Admission Complaint:  Fall at [facility], possible confusion today, + COVID swab 3/30/2024      Subjective:  feels improved, resting in chair, ongoing cough noted    Presenting Admission History:       Ros Min is a/an 88 y.o. female with a significant past medical history of possible dementia, prior breast cancer, hypertension, hyperlipidemia, and trigeminal neuralgia who presents to [facility] ED via EMS from [facility] due to staff concerns for worsening confusion and also had a low-level from a seated position while on the commode last night. ED staff had attempted to verify baseline mentation, F staff could not verify, but son on phone stated only intermittent confusion at times. Patient was AAO x 3 in ED. She notes she was attempting to get up off the commode, but did not have the leg strength to stand up fully from the toilet, and fell down to the floor. She denies any injury, noting it was a low-impact fall. She notes she was unable to get herself up though after attempting to get herself up twice. She ended up falling asleep on the floor because she was unable to alert the staff.  She states that she slept overnight on the floor, was found in the morning when staff found to check on her.  She notes that there is no pain secondary to the fall, no head injury, no neck pain. She does admit to having urinary frequency over the last 48 hours.  Additionally, [facility] staff checked a COVID test yesterday due to her having coughing, COVID test was positive.  Patient did not have a cough while in the ED per ED staff.  Urinalysis checked, was found to have pyuria and bacteriuria concerning for acute cystitis.  Other labs reviewed and pertinent for sodium 131, chloride 95, normal renal function, normal LFT, mild leukocytosis at 13.2, and normal hemoglobin.  Patient was started on ceftriaxone 1 g IV piggyback in the emergency  guaiFENesin-dextromethorphan     Labs:  Personally reviewed and interpreted for clinical significance.     Recent Labs     03/31/24  1800 04/01/24  0816   WBC 13.2* 8.8   HGB 13.3 12.4   HCT 40.1 37.2    227     Recent Labs     03/31/24  1800 04/01/24  0816   * 132*   K 3.9 3.6   CL 95* 99   CO2 21 18*   BUN 18 17   CREATININE 0.7 0.7   CALCIUM 9.5 9.0     No results for input(s): \"PROBNP\", \"TROPHS\" in the last 72 hours.  No results for input(s): \"LABA1C\" in the last 72 hours.  Recent Labs     03/31/24  1800   AST 24   ALT 10   BILITOT 1.0   ALKPHOS 83     No results for input(s): \"INR\", \"LACTA\", \"TSH\" in the last 72 hours.    Urine Cultures:   Lab Results   Component Value Date/Time    LABURIN No growth at 18 to 36 hours 09/04/2022 03:04 PM     Blood Cultures: No results found for: \"BC\"  No results found for: \"BLOODCULT2\"  Organism:   Lab Results   Component Value Date/Time    ORG BHS Group A (Strep pyogenes) 09/04/2022 06:38 PM         Checo Calabrese MD

## 2024-04-01 NOTE — H&P
Hospital Medicine History & Physical        Date of Service: 03/31/2024    Time of Service: 2155    Disposition:    []Admitted to inpatient status with expected LOS greater than two midnights due to medical therapy.  []Placed in observation status.    Historian: Self/patient, chart review, Care Everywhere, ED documentation    Chief Admission Complaint:  Fall at [facility], possible confusion today, + COVID swab 3/30/2024    Presenting Admission History:      Ros Min is a/an 88 y.o. female with a significant past medical history of possible dementia, prior breast cancer, hypertension, hyperlipidemia, and trigeminal neuralgia who presents to [facility] ED via EMS from [facility] due to staff concerns for worsening confusion and also had a low-level from a seated position while on the commode last night. ED staff had attempted to verify baseline mentation, [facility] staff could not verify, but son on phone stated only intermittent confusion at times. Patient was AAO x 3 in ED. She notes she was attempting to get up off the commode, but did not have the leg strength to stand up fully from the toilet, and fell down to the floor. She denies any injury, noting it was a low-impact fall. She notes she was unable to get herself up though after attempting to get herself up twice. She ended up falling asleep on the floor because she was unable to alert the staff.  She states that she slept overnight on the floor, was found in the morning when staff found to check on her.  She notes that there is no pain secondary to the fall, no head injury, no neck pain. She does admit to having urinary frequency over the last 48 hours.  Additionally, [facility] staff checked a COVID test yesterday due to her having coughing, COVID test was positive.  Patient did not have a cough while in the ED per ED staff.  Urinalysis checked, was found to have pyuria and bacteriuria concerning for acute cystitis.  Other labs reviewed and pertinent for sodium 131, chloride  cyanosis or edema bilaterally.  Full range of motion without deformity.  Neurologic:  Neurovascularly intact without any focal sensory/motor deficits. Cranial nerves: II-XII intact, grossly non-focal.  Psychiatric:  Alert and oriented, thought content appropriate, normal insight  Skin:  Skin color, texture, turgor normal.  No rashes or lesions.  Capillary Refill:  Brisk,< 3 seconds   Peripheral Pulses:  +2 palpable, equal bilaterally     Diet: [x]Adult/General  [x]Cardiac  []Diabetic  [x]Low Fat  []NPO  []NPO after Midnight  []Other   DVT Prophylaxis: [x]PPx LMWH  []SQ Heparin  []IPC/SCDs  []Eliquis  []Xarelto  []Coumadin     Code Status: []Full  [x]DNR/CCA  []Limited (DNR/CCA with Do Not Intubate)  []DNRCC  PT/OT Evaluation Status:   [x]NOT yet ordered  []Ordered and Pending   []Seen with Recommendations for:  []Home independently  []Home w/ assist  []HHC  []SNF  []Acute Rehab    Anticipated Discharge Day/Date:  04/03/2024    Anticipated Discharge Location: []Home  []HHC  []SNF  []Acute Rehab  [x]ECF  []LTAC  []Hospice    Consults:      IP CONSULT TO HOSPITALIST      This patient has a high likelihood of being discharged tomorrow and is appropriate for A1 Discharge Unit in AM pending clinical course overnight: []Yes  [x]No    --------------------------------------------------------------------------------------------------------------------------------------------------------------------    Imaging:     CT HEAD WO CONTRAST    Result Date: 3/31/2024  EXAMINATION: CT OF THE HEAD WITHOUT CONTRAST  3/31/2024 6:18 pm TECHNIQUE: CT of the head was performed without the administration of intravenous contrast. Automated exposure control, iterative reconstruction, and/or weight based adjustment of the mA/kV was utilized to reduce the radiation dose to as low as reasonably achievable. COMPARISON: 09/04/2022 HISTORY: ORDERING SYSTEM PROVIDED HISTORY: confusion TECHNOLOGIST PROVIDED HISTORY: Reason for exam:->confusion Has a

## 2024-04-01 NOTE — PROGRESS NOTES
Occupational Therapy  Facility/Department: [facility] C3 TELE/MED SURG/ONC  Occupational Therapy Initial Assessment    Name: Ros Min  : 1935  MRN: [id number]  Date of Service: 2024    Discharge Recommendations:   (return to [facility])  OT Equipment Recommendations  Equipment Needed: No       Patient Diagnosis(es): The primary encounter diagnosis was Acute cystitis without hematuria. Diagnoses of COVID, Unsteady gait, and Encephalopathy were also pertinent to this visit.  Past Medical History:  has a past medical history of Cancer (HCC), Hyperlipidemia, Hypertension, Trigeminal neuralgia, and Trigeminal neuralgia.  Past Surgical History:  has a past surgical history that includes ERCP (N/A, 2019); ERCP (2019); ERCP (2019); Mastectomy (Left); and Cholecystectomy, laparoscopic (N/A, 2019).       Therapy discharge recommendations are subject to collaboration from the patient’s interdisciplinary healthcare team, including MD and case management recommendations.  If pt is unable to be seen after this session, please let this note serve as discharge summary.  Please see case management note for discharge disposition.  Thank you.      Assessment   Performance deficits / Impairments: Decreased functional mobility ;Decreased safe awareness;Decreased balance;Decreased coordination;Decreased ADL status;Decreased cognition;Decreased high-level IADLs;Decreased endurance;Decreased strength  Assessment: Pt admitted to [facility] with fall, COVID. Pt resides at [place], reports IND with ADLs, but questionable historian. Pt completed functional mobility with CGA progressed to SBA without AD, completed standing grooming tasks with SBA. Pt would continue to benefit from acute OT at this time to improve functional mobility and ADL independence. D/c recs for return to facility when medically ready.     Prognosis: Good  Decision Making: Medium Complexity  REQUIRES OT FOLLOW-UP: Yes  Activity Tolerance  Activity  in Place: No  Bed Mobility Training  Bed Mobility Training: Yes  Overall Level of Assistance: Stand-by assistance;Adaptive equipment;Additional time (HOB slightly elevated)  Supine to Sit: Stand-by assistance;Additional time  Sit to Supine:  (pt up in chair at EOS)  Balance  Sitting: Intact  Standing: High guard  Transfer Training  Transfer Training: Yes  Interventions: Verbal cues;Safety awareness training  Sit to Stand: Contact-guard assistance;Additional time  Stand to Sit: Contact-guard assistance;Adaptive equipment;Additional time  Bed to Chair: Contact-guard assistance;Stand-by assistance;Additional time (CGA progressed to SBA, without AD)     AROM: Generally decreased, functional  Strength: Generally decreased, functional  Coordination: Generally decreased, functional  Tone: Normal  Sensation: Intact  ADL  Grooming: Stand by assistance;Contact guard assistance  Grooming Skilled Clinical Factors: at sink to complete oral hygiene and wash face  Functional Mobility: Stand by assistance;Contact guard assistance  Additional Comments: declined further ADLs              Vision  Vision: Impaired  Vision Exceptions: Wears glasses at all times  Hearing  Hearing: Exceptions to WFL  Hearing Exceptions: Hard of hearing/hearing concerns;Left hearing aid  Cognition  Overall Cognitive Status: Exceptions  Arousal/Alertness: Appropriate responses to stimuli  Following Commands: Follows one step commands consistently  Attention Span: Attends with cues to redirect  Safety Judgement: Decreased awareness of need for assistance;Decreased awareness of need for safety  Insights: Decreased awareness of deficits  Initiation: Requires cues for some  Sequencing: Requires cues for some  Orientation  Overall Orientation Status: Impaired  Orientation Level: Oriented to person;Oriented to place;Disoriented to time;Oriented to situation (able to state name and birthday, but unsure of age)                  Education Given To:

## 2024-04-02 LAB
ANION GAP SERPL CALCULATED.3IONS-SCNC: 11 MMOL/L (ref 3–16)
BASOPHILS # BLD: 0.1 K/UL (ref 0–0.2)
BASOPHILS NFR BLD: 0.8 %
BUN SERPL-MCNC: 13 MG/DL (ref 7–20)
CALCIUM SERPL-MCNC: 8.8 MG/DL (ref 8.3–10.6)
CHLORIDE SERPL-SCNC: 105 MMOL/L (ref 99–110)
CO2 SERPL-SCNC: 20 MMOL/L (ref 21–32)
CREAT SERPL-MCNC: 0.6 MG/DL (ref 0.6–1.2)
CRP SERPL-MCNC: 49.6 MG/L (ref 0–5.1)
DEPRECATED RDW RBC AUTO: 13.2 % (ref 12.4–15.4)
EOSINOPHIL # BLD: 0.3 K/UL (ref 0–0.6)
EOSINOPHIL NFR BLD: 5.3 %
ERYTHROCYTE [SEDIMENTATION RATE] IN BLOOD BY WESTERGREN METHOD: 55 MM/HR (ref 0–30)
GFR SERPLBLD CREATININE-BSD FMLA CKD-EPI: 87 ML/MIN/{1.73_M2}
GLUCOSE SERPL-MCNC: 89 MG/DL (ref 70–99)
HCT VFR BLD AUTO: 36.3 % (ref 36–48)
HGB BLD-MCNC: 12 G/DL (ref 12–16)
LYMPHOCYTES # BLD: 1.7 K/UL (ref 1–5.1)
LYMPHOCYTES NFR BLD: 26.8 %
MAGNESIUM SERPL-MCNC: 1.7 MG/DL (ref 1.8–2.4)
MCH RBC QN AUTO: 31.4 PG (ref 26–34)
MCHC RBC AUTO-ENTMCNC: 33 G/DL (ref 31–36)
MCV RBC AUTO: 95.3 FL (ref 80–100)
MONOCYTES # BLD: 0.9 K/UL (ref 0–1.3)
MONOCYTES NFR BLD: 14.6 %
NEUTROPHILS # BLD: 3.3 K/UL (ref 1.7–7.7)
NEUTROPHILS NFR BLD: 52.5 %
PLATELET # BLD AUTO: 225 K/UL (ref 135–450)
PMV BLD AUTO: 7.2 FL (ref 5–10.5)
POTASSIUM SERPL-SCNC: 3.5 MMOL/L (ref 3.5–5.1)
RBC # BLD AUTO: 3.81 M/UL (ref 4–5.2)
SODIUM SERPL-SCNC: 136 MMOL/L (ref 136–145)
WBC # BLD AUTO: 6.3 K/UL (ref 4–11)

## 2024-04-02 PROCEDURE — 86140 C-REACTIVE PROTEIN: CPT

## 2024-04-02 PROCEDURE — 6360000002 HC RX W HCPCS: Performed by: NURSE PRACTITIONER

## 2024-04-02 PROCEDURE — 1200000000 HC SEMI PRIVATE

## 2024-04-02 PROCEDURE — 6370000000 HC RX 637 (ALT 250 FOR IP): Performed by: INTERNAL MEDICINE

## 2024-04-02 PROCEDURE — 6360000002 HC RX W HCPCS: Performed by: INTERNAL MEDICINE

## 2024-04-02 PROCEDURE — 2580000003 HC RX 258: Performed by: INTERNAL MEDICINE

## 2024-04-02 PROCEDURE — 36415 COLL VENOUS BLD VENIPUNCTURE: CPT

## 2024-04-02 PROCEDURE — 83735 ASSAY OF MAGNESIUM: CPT

## 2024-04-02 PROCEDURE — 80048 BASIC METABOLIC PNL TOTAL CA: CPT

## 2024-04-02 PROCEDURE — 85025 COMPLETE CBC W/AUTO DIFF WBC: CPT

## 2024-04-02 PROCEDURE — 97535 SELF CARE MNGMENT TRAINING: CPT

## 2024-04-02 PROCEDURE — 2580000003 HC RX 258: Performed by: NURSE PRACTITIONER

## 2024-04-02 PROCEDURE — 6370000000 HC RX 637 (ALT 250 FOR IP): Performed by: NURSE PRACTITIONER

## 2024-04-02 PROCEDURE — 97110 THERAPEUTIC EXERCISES: CPT

## 2024-04-02 PROCEDURE — 2580000003 HC RX 258: Performed by: EMERGENCY MEDICINE

## 2024-04-02 PROCEDURE — 85652 RBC SED RATE AUTOMATED: CPT

## 2024-04-02 RX ORDER — LOSARTAN POTASSIUM 25 MG/1
50 TABLET ORAL DAILY
Status: DISCONTINUED | OUTPATIENT
Start: 2024-04-02 | End: 2024-04-04

## 2024-04-02 RX ORDER — HYDROCHLOROTHIAZIDE 12.5 MG/1
12.5 CAPSULE, GELATIN COATED ORAL DAILY
Status: DISCONTINUED | OUTPATIENT
Start: 2024-04-02 | End: 2024-04-04

## 2024-04-02 RX ORDER — DONEPEZIL HYDROCHLORIDE 5 MG/1
5 TABLET, FILM COATED ORAL NIGHTLY
Status: DISCONTINUED | OUTPATIENT
Start: 2024-04-02 | End: 2024-04-04 | Stop reason: HOSPADM

## 2024-04-02 RX ORDER — POTASSIUM CHLORIDE 20 MEQ/1
40 TABLET, EXTENDED RELEASE ORAL ONCE
Status: COMPLETED | OUTPATIENT
Start: 2024-04-02 | End: 2024-04-02

## 2024-04-02 RX ORDER — HYDRALAZINE HYDROCHLORIDE 20 MG/ML
5 INJECTION INTRAMUSCULAR; INTRAVENOUS EVERY 6 HOURS PRN
Status: DISCONTINUED | OUTPATIENT
Start: 2024-04-02 | End: 2024-04-04 | Stop reason: HOSPADM

## 2024-04-02 RX ORDER — DONEPEZIL HYDROCHLORIDE 5 MG/1
5 TABLET, FILM COATED ORAL NIGHTLY
COMMUNITY

## 2024-04-02 RX ORDER — OLMESARTAN MEDOXOMIL AND HYDROCHLOROTHIAZIDE 20/12.5 20; 12.5 MG/1; MG/1
1 TABLET ORAL DAILY
COMMUNITY

## 2024-04-02 RX ORDER — MAGNESIUM SULFATE IN WATER 40 MG/ML
2000 INJECTION, SOLUTION INTRAVENOUS ONCE
Status: COMPLETED | OUTPATIENT
Start: 2024-04-02 | End: 2024-04-02

## 2024-04-02 RX ORDER — OLMESARTAN MEDOXOMIL AND HYDROCHLOROTHIAZIDE 20/12.5 20; 12.5 MG/1; MG/1
1 TABLET ORAL DAILY
Status: DISCONTINUED | OUTPATIENT
Start: 2024-04-02 | End: 2024-04-02 | Stop reason: CLARIF

## 2024-04-02 RX ADMIN — POTASSIUM CHLORIDE 40 MEQ: 1500 TABLET, EXTENDED RELEASE ORAL at 09:59

## 2024-04-02 RX ADMIN — CEFTRIAXONE SODIUM 1000 MG: 1 INJECTION, POWDER, FOR SOLUTION INTRAMUSCULAR; INTRAVENOUS at 21:33

## 2024-04-02 RX ADMIN — SERTRALINE 25 MG: 50 TABLET, FILM COATED ORAL at 21:33

## 2024-04-02 RX ADMIN — ASPIRIN 81 MG: 81 TABLET, COATED ORAL at 09:12

## 2024-04-02 RX ADMIN — LOSARTAN POTASSIUM 50 MG: 25 TABLET, FILM COATED ORAL at 16:13

## 2024-04-02 RX ADMIN — HYDRALAZINE HYDROCHLORIDE 5 MG: 20 INJECTION INTRAMUSCULAR; INTRAVENOUS at 21:29

## 2024-04-02 RX ADMIN — SODIUM CHLORIDE, PRESERVATIVE FREE 10 ML: 5 INJECTION INTRAVENOUS at 21:34

## 2024-04-02 RX ADMIN — HYDROCHLOROTHIAZIDE 12.5 MG: 12.5 CAPSULE ORAL at 16:13

## 2024-04-02 RX ADMIN — ATENOLOL 50 MG: 25 TABLET ORAL at 09:12

## 2024-04-02 RX ADMIN — ENOXAPARIN SODIUM 30 MG: 100 INJECTION SUBCUTANEOUS at 09:12

## 2024-04-02 RX ADMIN — SODIUM CHLORIDE 1000 ML: 9 INJECTION, SOLUTION INTRAVENOUS at 16:12

## 2024-04-02 RX ADMIN — SODIUM CHLORIDE 1000 ML: 9 INJECTION, SOLUTION INTRAVENOUS at 04:19

## 2024-04-02 RX ADMIN — MAGNESIUM SULFATE HEPTAHYDRATE 2000 MG: 40 INJECTION, SOLUTION INTRAVENOUS at 10:02

## 2024-04-02 RX ADMIN — DONEPEZIL HYDROCHLORIDE 5 MG: 5 TABLET, FILM COATED ORAL at 21:33

## 2024-04-02 NOTE — PLAN OF CARE
Problem: Pain  Goal: Verbalizes/displays adequate comfort level or baseline comfort level  Outcome: Progressing  Flowsheets (Taken 4/1/2024 2122)  Verbalizes/displays adequate comfort level or baseline comfort level:   Encourage patient to monitor pain and request assistance   Assess pain using appropriate pain scale   Administer analgesics based on type and severity of pain and evaluate response   Implement non-pharmacological measures as appropriate and evaluate response     Problem: Skin/Tissue Integrity  Goal: Absence of new skin breakdown  Description: 1.  Monitor for areas of redness and/or skin breakdown  2.  Assess vascular access sites hourly  3.  Every 4-6 hours minimum:  Change oxygen saturation probe site  4.  Every 4-6 hours:  If on nasal continuous positive airway pressure, respiratory therapy assess nares and determine need for appliance change or resting period.  Outcome: Progressing     Problem: Safety - Adult  Goal: Free from fall injury  Outcome: Progressing  Flowsheets (Taken 4/1/2024 2122)  Free From Fall Injury:   Based on caregiver fall risk screen, instruct family/caregiver to ask for assistance with transferring infant if caregiver noted to have fall risk factors   Instruct family/caregiver on patient safety

## 2024-04-02 NOTE — PROGRESS NOTES
Pt is alert and oriented with intermittent confusion. VSS. RA. Pt denies pain and discomfort at this time. Breath sounds diminished bilaterally. Pt with occasional cough. Shift assessment completed and documented. Call light within reach. Bed side table within reach. Wheels locked. Bed in lowest position. Bed check in place. Pt instructed to call out for assistance. Pt expressesed understanding & calls out appropritately. All care per orders. Electronically signed by Zee Mcclain RN on 4/1/2024 at 9:16 PM

## 2024-04-02 NOTE — PLAN OF CARE
Problem: Pain  Goal: Verbalizes/displays adequate comfort level or baseline comfort level  4/2/2024 0752 by Zee Mcclain, RN  Outcome: Progressing  Flowsheets (Taken 4/2/2024 0752)  Verbalizes/displays adequate comfort level or baseline comfort level:   Encourage patient to monitor pain and request assistance   Assess pain using appropriate pain scale   Administer analgesics based on type and severity of pain and evaluate response   Implement non-pharmacological measures as appropriate and evaluate response     Problem: Skin/Tissue Integrity  Goal: Absence of new skin breakdown  Description: 1.  Monitor for areas of redness and/or skin breakdown  2.  Assess vascular access sites hourly  3.  Every 4-6 hours minimum:  Change oxygen saturation probe site  4.  Every 4-6 hours:  If on nasal continuous positive airway pressure, respiratory therapy assess nares and determine need for appliance change or resting period.  4/2/2024 0752 by Zee Mcclain, RN  Outcome: Progressing     Problem: Safety - Adult  Goal: Free from fall injury  4/2/2024 0752 by Zee Mcclain, RN  Outcome: Progressing  Flowsheets (Taken 4/2/2024 0752)  Free From Fall Injury:   Instruct family/caregiver on patient safety   Based on caregiver fall risk screen, instruct family/caregiver to ask for assistance with transferring infant if caregiver noted to have fall risk factors

## 2024-04-02 NOTE — PROGRESS NOTES
Received patient in bed, alert oriented x3, VSS. Shift assessment done as charted. Medications administered per MAR.     -on continuous IV fluids  -ambulates with SB  -complaints of dry cough, PRN medicine given.  -tolerating PO intake well  -denies further needs at this time.    Nonskid footware on. Bed alarm on. Bed in low position, wheels locked, SR x2, call light and bedside table within reach.

## 2024-04-02 NOTE — PROGRESS NOTES
Pt in bed upon OT arrival, pleasant and agreeable to therapy  Pain: denied pain at rest  Orientation  Overall Orientation Status: Impaired  Orientation Level: Oriented to person;Oriented to place;Disoriented to time;Oriented to situation  Cognition  Overall Cognitive Status: Exceptions  Arousal/Alertness: Appropriate responses to stimuli  Following Commands: Follows one step commands consistently  Attention Span: Attends with cues to redirect  Memory: Decreased recall of recent events  Safety Judgement: Decreased awareness of need for assistance;Decreased awareness of need for safety  Insights: Decreased awareness of deficits  Initiation: Requires cues for some  Sequencing: Requires cues for some        Objective    Vitals     Vitals:    04/02/24 1534   BP: (!) 152/85   Pulse: 67   Resp: 18   Temp: 97.8 °F (36.6 °C)   SpO2: 92%       Bed Mobility Training  Bed Mobility Training: Yes  Supine to Sit: Stand-by assistance (HOB elevated, use of rails)  Sit to Supine:  (Pt in chair at end of session)  Scooting: Stand-by assistance (to EOB)  Balance  Sitting: Intact  Standing: High guard  Transfer Training  Transfer Training: Yes  Sit to Stand: Contact-guard assistance  Stand to Sit: Contact-guard assistance  Bed to Chair: Stand-by assistance (EOB>bathroom>chair; no AD)  Gait  Gait Training: Yes  Overall Level of Assistance: Contact-guard assistance  Assistive Device: Gait belt     ADL  Grooming: Stand by assistance  Grooming Skilled Clinical Factors: standing at sink to perform oral hygiene  Functional Mobility: Contact guard assistance  Functional Mobility Skilled Clinical Factors: no AD  OT Exercises  A/AROM Exercises: seated in chair, x15 BUE: wrist flexion/extension, elbow flexion/extension, shoulder flexion to 90*, scapular elevation and retraction     Safety Devices  Type of Devices: Chair alarm in place;Call light within reach;Left in chair;Gait belt;Nurse notified;Patient at risk for falls;Heels elevated for  pressure relief  Restraints  Restraints Initially in Place: No     Patient Education  Education Given To: Patient  Education Provided: Role of Therapy;Transfer Training;Equipment;Plan of Care;Orientation;Precautions;Home Exercise Program;ADL Adaptive Strategies  Education Method: Demonstration;Verbal  Barriers to Learning: Cognition;Hearing  Education Outcome: Verbalized understanding;Continued education needed    Goals  Short Term Goals  Time Frame for Short Term Goals: 4/8, unless otherwise noted- goals ongoing 4/02  Short Term Goal 1: Pt will complete functional transfer/toilet transfer with SPV  Short Term Goal 2: Pt will complete toileting with SPV  Short Term Goal 3: Pt will complete LB dressing with SPV  Short Term Goal 4: Pt will complete UE HEP 15x reps by 4/6    AM-PAC - ADL  AM-PAC Daily Activity - Inpatient   How much help is needed for putting on and taking off regular lower body clothing?: A Little  How much help is needed for bathing (which includes washing, rinsing, drying)?: A Little  How much help is needed for toileting (which includes using toilet, bedpan, or urinal)?: A Little  How much help is needed for putting on and taking off regular upper body clothing?: A Little  How much help is needed for taking care of personal grooming?: None  How much help for eating meals?: None  AM-PAC Inpatient Daily Activity Raw Score: 20  AM-PAC Inpatient ADL T-Scale Score : 42.03  ADL Inpatient CMS 0-100% Score: 38.32  ADL Inpatient CMS G-Code Modifier : CJ    Therapy Time   Individual Concurrent Group Co-treatment   Time In 1528         Time Out 1555         Minutes 27         Timed Code Treatment Minutes: 27 Minutes       Valentina Lui OT

## 2024-04-02 NOTE — PLAN OF CARE
Problem: Pain  Goal: Verbalizes/displays adequate comfort level or baseline comfort level  4/1/2024 2250 by Kenton Pichardo RN  Outcome: Progressing  Flowsheets (Taken 4/1/2024 2250)  Verbalizes/displays adequate comfort level or baseline comfort level:   Encourage patient to monitor pain and request assistance   Assess pain using appropriate pain scale   Administer analgesics based on type and severity of pain and evaluate response     Problem: Skin/Tissue Integrity  Goal: Absence of new skin breakdown  Description: 1.  Monitor for areas of redness and/or skin breakdown  2.  Assess vascular access sites hourly  3.  Every 4-6 hours minimum:  Change oxygen saturation probe site  4.  Every 4-6 hours:  If on nasal continuous positive airway pressure, respiratory therapy assess nares and determine need for appliance change or resting period.  4/1/2024 2250 by Kenton Pichardo, RN  Outcome: Progressing     Problem: ABCDS Injury Assessment  Goal: Absence of physical injury  Outcome: Progressing  Flowsheets (Taken 4/1/2024 2250)  Absence of Physical Injury: Implement safety measures based on patient assessment     Problem: Safety - Adult  Goal: Free from fall injury  4/1/2024 2250 by Kenton Pichardo, RN  Outcome: Progressing

## 2024-04-02 NOTE — PROGRESS NOTES
Hospital Medicine Progress Note      Date of Admission: 3/31/2024  Hospital Day: 3      Chief Admission Complaint:  Fall at [facility], possible confusion today, + COVID swab 3/30/2024      Subjective:  feels improved, resting in bed, no overnight events, is questioning covid diagnosis     Presenting Admission History:       Ros Min is a/an 88 y.o. female with a significant past medical history of possible dementia, prior breast cancer, hypertension, hyperlipidemia, and trigeminal neuralgia who presents to [facility] ED via EMS from [facility] due to staff concerns for worsening confusion and also had a low-level from a seated position while on the commode last night. ED staff had attempted to verify baseline mentation, F staff could not verify, but son on phone stated only intermittent confusion at times. Patient was AAO x 3 in ED. She notes she was attempting to get up off the commode, but did not have the leg strength to stand up fully from the toilet, and fell down to the floor. She denies any injury, noting it was a low-impact fall. She notes she was unable to get herself up though after attempting to get herself up twice. She ended up falling asleep on the floor because she was unable to alert the staff.  She states that she slept overnight on the floor, was found in the morning when staff found to check on her.  She notes that there is no pain secondary to the fall, no head injury, no neck pain. She does admit to having urinary frequency over the last 48 hours.  Additionally, [facility] staff checked a COVID test yesterday due to her having coughing, COVID test was positive.  Patient did not have a cough while in the ED per ED staff.  Urinalysis checked, was found to have pyuria and bacteriuria concerning for acute cystitis.  Other labs reviewed and pertinent for sodium 131, chloride 95, normal renal function, normal LFT, mild leukocytosis at 13.2, and normal hemoglobin.  Patient was started on ceftriaxone 1 g IV  patient has a high likelihood of being discharged tomorrow and is appropriate for A1 Discharge Unit in AM pending clinical course overnight: []Yes  [x]No    ------------------------------------------------------------------------------------------------------------------------------------------------------------------------    MDM      [x] High (any 2)    A. Problems (any 1)  [x] Acute/Chronic Illness/injury posing threat to life or bodily function:    [] Severe exacerbation of chronic illness:    ---------------------------------------------------------------------  B. Risk of Treatment (any 1)   [] Drugs/treatments that require intensive monitoring for toxicity include:    [] Change in code status:    [] Decision to escalate care:    [] Major surgery/procedure with associated risk factors:    ----------------------------------------------------------------------  C. Data (any 2)  [x] Discussed current management and discharge planning options with Case Management.  [] Discussed management of the case with:    [] Telemetry personally reviewed and interpreted as documented above    [] Imaging personally reviewed and interpreted, includes:    [x] Data Review (any 3)  [] Collateral history obtained from:    [x] All available Consultant notes from yesterday/today were reviewed  [x] All current labs were reviewed and interpreted for clinical significance   [x] Appropriate follow-up labs were ordered    Medications:  Personally reviewed in detail in conjunction w/ labs as documented for evidence of drug toxicity.     Infusion Medications    sodium chloride 1,000 mL (04/02/24 1612)    sodium chloride      sodium chloride       Scheduled Medications    sertraline  25 mg Oral Nightly    donepezil  5 mg Oral Nightly    losartan  50 mg Oral Daily    And    hydroCHLOROthiazide  12.5 mg Oral Daily    aspirin  81 mg Oral Daily    atenolol  50 mg Oral Daily    sodium chloride flush  5-40 mL IntraVENous 2 times per day

## 2024-04-02 NOTE — PROGRESS NOTES
Pharmacy Medication History Note      List of current medications patient is taking is complete.   Prior to Admission medications    Medication Sig Start Date End Date Taking? Authorizing Provider   donepezil (ARICEPT) 5 MG tablet Take 1 tablet by mouth nightly   Yes Jordan Araujo MD   olmesartan-hydroCHLOROthiazide (BENICAR HCT) 20-12.5 MG per tablet Take 1 tablet by mouth daily   Yes Jordan Araujo MD   Cholecalciferol (VITAMIN D3) 50 MCG (2000 UT) TABS Take 1 tablet by mouth daily   Yes Jordan Araujo MD   sertraline (ZOLOFT) 25 MG tablet Take 1 tablet by mouth at bedtime   Yes Jordan Araujo MD   olmesartan (BENICAR) 20 MG tablet Take 1 tablet by mouth daily  4/2/24  Jordan Araujo MD   aspirin 81 MG EC tablet Take 1 tablet by mouth daily 9/7/22   Chinmay Reynoso DO   lisinopril (PRINIVIL;ZESTRIL) 5 MG tablet Take 1 tablet by mouth daily  Patient not taking: Reported on 4/1/2024 9/7/22   Chinmay Reynoso DO   naproxen (NAPROSYN) 250 MG tablet Take 1 tablet by mouth 2 times daily  Patient not taking: Reported on 4/2/2024 2/19/22   Pipe Martines PA   lidocaine (LIDODERM) 5 % Place 1 patch onto the skin daily 12 hours on, 12 hours off.  Patient not taking: Reported on 4/2/2024 2/19/22   Pipe Martines PA   acetaminophen (TYLENOL) 500 MG tablet Take 500 mg by mouth every 6 hours as needed for Pain    Jordan Araujo MD   gabapentin (NEURONTIN) 300 MG capsule Take 200 mg by mouth 2 times daily. .  Patient not taking: Reported on 4/1/2024    Jordan Araujo MD   atenolol (TENORMIN) 50 MG tablet Take 1 tablet by mouth daily    Jordan Araujo MD        Source of information: [facility] beds at the [facility] medication list; chart review; outpatient fill history (Surescripts)      Medications notes:   1. Medications added:    donepezil (ARICEPT)  olmesartan-hydrochlorothiazide (BENICAR HCT)  cholecalciferol (VITAMIN D)   sertraline (ZOLOFT)    2. Medications deleted:    gabapentin (NEURONTIN)  lidocaine (LIDODERM)   lisinopril (PRINIVIL, ZESTRIL)  naproxen (NAPROSYN)    3. The following updates were made to the home medications: [Drug interaction, high risk med, med non-adherence note, clarification of dosage form or strength, clarification of directions, addition or removal of medication]  N/A       Please contact me with any questions.    Karlene Gaffney, PharmD   4/2/2024  12:34 PM

## 2024-04-02 NOTE — CARE COORDINATION
CM update: LOS # 2; Followed by IM, PT/OT/SLP. Patient will return to [facility] no barriers.Elissa Boles RN

## 2024-04-03 LAB
ANION GAP SERPL CALCULATED.3IONS-SCNC: 10 MMOL/L (ref 3–16)
BASOPHILS # BLD: 0.1 K/UL (ref 0–0.2)
BASOPHILS NFR BLD: 1 %
BUN SERPL-MCNC: 8 MG/DL (ref 7–20)
CALCIUM SERPL-MCNC: 9.1 MG/DL (ref 8.3–10.6)
CHLORIDE SERPL-SCNC: 102 MMOL/L (ref 99–110)
CO2 SERPL-SCNC: 22 MMOL/L (ref 21–32)
CREAT SERPL-MCNC: <0.5 MG/DL (ref 0.6–1.2)
DEPRECATED RDW RBC AUTO: 13 % (ref 12.4–15.4)
EOSINOPHIL # BLD: 0.3 K/UL (ref 0–0.6)
EOSINOPHIL NFR BLD: 4.8 %
GFR SERPLBLD CREATININE-BSD FMLA CKD-EPI: >90 ML/MIN/{1.73_M2}
GLUCOSE SERPL-MCNC: 97 MG/DL (ref 70–99)
HCT VFR BLD AUTO: 36.7 % (ref 36–48)
HGB BLD-MCNC: 12.5 G/DL (ref 12–16)
LYMPHOCYTES # BLD: 1.9 K/UL (ref 1–5.1)
LYMPHOCYTES NFR BLD: 30.3 %
MCH RBC QN AUTO: 31.7 PG (ref 26–34)
MCHC RBC AUTO-ENTMCNC: 33.9 G/DL (ref 31–36)
MCV RBC AUTO: 93.5 FL (ref 80–100)
MONOCYTES # BLD: 0.7 K/UL (ref 0–1.3)
MONOCYTES NFR BLD: 10.7 %
NEUTROPHILS # BLD: 3.4 K/UL (ref 1.7–7.7)
NEUTROPHILS NFR BLD: 53.2 %
PLATELET # BLD AUTO: 267 K/UL (ref 135–450)
PMV BLD AUTO: 7.2 FL (ref 5–10.5)
POTASSIUM SERPL-SCNC: 4 MMOL/L (ref 3.5–5.1)
RBC # BLD AUTO: 3.93 M/UL (ref 4–5.2)
SODIUM SERPL-SCNC: 134 MMOL/L (ref 136–145)
WBC # BLD AUTO: 6.3 K/UL (ref 4–11)

## 2024-04-03 PROCEDURE — 6370000000 HC RX 637 (ALT 250 FOR IP): Performed by: NURSE PRACTITIONER

## 2024-04-03 PROCEDURE — 6360000002 HC RX W HCPCS: Performed by: INTERNAL MEDICINE

## 2024-04-03 PROCEDURE — 97110 THERAPEUTIC EXERCISES: CPT

## 2024-04-03 PROCEDURE — 6360000002 HC RX W HCPCS: Performed by: NURSE PRACTITIONER

## 2024-04-03 PROCEDURE — 6370000000 HC RX 637 (ALT 250 FOR IP): Performed by: INTERNAL MEDICINE

## 2024-04-03 PROCEDURE — 80048 BASIC METABOLIC PNL TOTAL CA: CPT

## 2024-04-03 PROCEDURE — 2580000003 HC RX 258: Performed by: EMERGENCY MEDICINE

## 2024-04-03 PROCEDURE — 97535 SELF CARE MNGMENT TRAINING: CPT

## 2024-04-03 PROCEDURE — 97530 THERAPEUTIC ACTIVITIES: CPT

## 2024-04-03 PROCEDURE — 97116 GAIT TRAINING THERAPY: CPT

## 2024-04-03 PROCEDURE — 1200000000 HC SEMI PRIVATE

## 2024-04-03 PROCEDURE — 85025 COMPLETE CBC W/AUTO DIFF WBC: CPT

## 2024-04-03 PROCEDURE — 36415 COLL VENOUS BLD VENIPUNCTURE: CPT

## 2024-04-03 PROCEDURE — 2580000003 HC RX 258: Performed by: NURSE PRACTITIONER

## 2024-04-03 PROCEDURE — 2580000003 HC RX 258: Performed by: INTERNAL MEDICINE

## 2024-04-03 RX ORDER — AMLODIPINE BESYLATE 5 MG/1
5 TABLET ORAL DAILY
Status: DISCONTINUED | OUTPATIENT
Start: 2024-04-04 | End: 2024-04-04 | Stop reason: HOSPADM

## 2024-04-03 RX ORDER — AMLODIPINE BESYLATE 2.5 MG/1
2.5 TABLET ORAL DAILY
Status: DISCONTINUED | OUTPATIENT
Start: 2024-04-03 | End: 2024-04-03

## 2024-04-03 RX ADMIN — SODIUM CHLORIDE 1000 ML: 9 INJECTION, SOLUTION INTRAVENOUS at 03:27

## 2024-04-03 RX ADMIN — ATENOLOL 50 MG: 25 TABLET ORAL at 09:24

## 2024-04-03 RX ADMIN — SERTRALINE 25 MG: 50 TABLET, FILM COATED ORAL at 20:01

## 2024-04-03 RX ADMIN — AMLODIPINE BESYLATE 2.5 MG: 2.5 TABLET ORAL at 14:31

## 2024-04-03 RX ADMIN — SODIUM CHLORIDE, PRESERVATIVE FREE 10 ML: 5 INJECTION INTRAVENOUS at 09:24

## 2024-04-03 RX ADMIN — LOSARTAN POTASSIUM 50 MG: 25 TABLET, FILM COATED ORAL at 09:24

## 2024-04-03 RX ADMIN — ENOXAPARIN SODIUM 30 MG: 100 INJECTION SUBCUTANEOUS at 09:22

## 2024-04-03 RX ADMIN — ACETAMINOPHEN 650 MG: 325 TABLET ORAL at 09:24

## 2024-04-03 RX ADMIN — HYDROCHLOROTHIAZIDE 12.5 MG: 12.5 CAPSULE ORAL at 09:22

## 2024-04-03 RX ADMIN — CEFTRIAXONE SODIUM 1000 MG: 1 INJECTION, POWDER, FOR SOLUTION INTRAMUSCULAR; INTRAVENOUS at 20:04

## 2024-04-03 RX ADMIN — ASPIRIN 81 MG: 81 TABLET, COATED ORAL at 09:24

## 2024-04-03 RX ADMIN — DONEPEZIL HYDROCHLORIDE 5 MG: 5 TABLET, FILM COATED ORAL at 20:01

## 2024-04-03 RX ADMIN — SODIUM CHLORIDE, PRESERVATIVE FREE 10 ML: 5 INJECTION INTRAVENOUS at 20:05

## 2024-04-03 NOTE — PROGRESS NOTES
Pt oriented to self, place, time, Appropriate speech. Pt understands family attending family wedding. Still impulsive getting OOB without using call light. Easily redirectable. Appetite fair, O-2 RA, SR 2/4, call light w/in reach, bedcheck on, AVASYS on.

## 2024-04-03 NOTE — PROGRESS NOTES
/82. Noted redness on IV site on left arm. IV line removed and transferred to right forearm. Hydralazine administered.      2203: BP rechecked 165/66. Will continue to monitor.

## 2024-04-03 NOTE — PROGRESS NOTES
Pt is alert and oriented with intermittent confusion. VSS. RA. Pt denies pain and discomfort at this time. Breath sounds diminished bilaterally. Pt with occasion nonproductive cough. Pt impulsive at times when wanting to get up or sit on side of bed. Shift assessment completed and documented. Call light within reach. Bed side table within reach. Wheels locked. Bed in lowest position. Bed check in place. Pt instructed to call out for assistance. Pt expressesed understanding & calls out appropritately. All care per orders. Electronically signed by Zee Mcclain RN on 4/2/2024 at 8:44 PM

## 2024-04-03 NOTE — PROGRESS NOTES
Physical Therapy  Facility/Department: [facility] C3 TELE/MED SURG/ONC  Daily Treatment Note  NAME: Ros Min  : 1935  MRN: [id number]    Date of Service: 4/3/2024    Discharge Recommendations:  Home with assist PRN, Home with Home health PT   PT Equipment Recommendations  Equipment Needed: No    Patient Diagnosis(es): The primary encounter diagnosis was Acute cystitis without hematuria. Diagnoses of COVID, Unsteady gait, and Encephalopathy were also pertinent to this visit.    Assessment   Assessment: Pt is awake and agreeable to therapy session. Pt is limited by decreased balance and activity tolerance. She requires CG assistance with ambulation in room x multiple laps up to 80ft, at first using walker but progressing to no AD. Pt not taken in the hallway this date due to isolation precautions. She requires increased time for pericare. Recommend continued home health PT upon discharge to progress towards prior level of function and address remaining balance and mobility deficits.  Activity Tolerance: Patient tolerated treatment well;Patient limited by endurance  Equipment Needed: No       Plan    Physical Therapy Plan  General Plan: 3-5 times per week  Current Treatment Recommendations: Balance training;Functional mobility training;Strengthening;Transfer training;Endurance training;Pain management;Neuromuscular re-education;Gait training;Safety education & training;Home exercise program;Therapeutic activities;Patient/Caregiver education & training     Restrictions  Restrictions/Precautions  Restrictions/Precautions: Up as Tolerated, Isolation, Fall Risk  Required Braces or Orthoses?: No  Position Activity Restriction  Other position/activity restrictions: Droplet Plus, IV     Subjective    Subjective  Pain: pt denies any pain  Orientation  Overall Orientation Status: Impaired  Orientation Level: Oriented to person;Oriented to place;Disoriented to time;Oriented to situation  Cognition  Overall Cognitive

## 2024-04-03 NOTE — PROGRESS NOTES
Hospital Medicine Progress Note      Date of Admission: 3/31/2024  Hospital Day: 4    Chief Admission Complaint:  Fall at [facility], possible confusion today, + COVID swab 3/30/2024      Subjective:  feels improved, resting in bed, no overnight events, is still questioning covid diagnosis     Presenting Admission History:       Ros Min is a/an 88 y.o. female with a significant past medical history of possible dementia, prior breast cancer, hypertension, hyperlipidemia, and trigeminal neuralgia who presents to [facility] ED via EMS from [facility] due to staff concerns for worsening confusion and also had a low-level from a seated position while on the commode last night. ED staff had attempted to verify baseline mentation, [facility] staff could not verify, but son on phone stated only intermittent confusion at times. Patient was AAO x 3 in ED. She notes she was attempting to get up off the commode, but did not have the leg strength to stand up fully from the toilet, and fell down to the floor. She denies any injury, noting it was a low-impact fall. She notes she was unable to get herself up though after attempting to get herself up twice. She ended up falling asleep on the floor because she was unable to alert the staff.  She states that she slept overnight on the floor, was found in the morning when staff found to check on her.  She notes that there is no pain secondary to the fall, no head injury, no neck pain. She does admit to having urinary frequency over the last 48 hours.  Additionally, [facility] staff checked a COVID test yesterday due to her having coughing, COVID test was positive.  Patient did not have a cough while in the ED per ED staff.  Urinalysis checked, was found to have pyuria and bacteriuria concerning for acute cystitis.  Other labs reviewed and pertinent for sodium 131, chloride 95, normal renal function, normal LFT, mild leukocytosis at 13.2, and normal hemoglobin.  Patient was started on ceftriaxone 1 g IV  Nightly    losartan  50 mg Oral Daily    And    hydroCHLOROthiazide  12.5 mg Oral Daily    aspirin  81 mg Oral Daily    atenolol  50 mg Oral Daily    sodium chloride flush  5-40 mL IntraVENous 2 times per day    enoxaparin  30 mg SubCUTAneous Daily    cefTRIAXone (ROCEPHIN) IV  1,000 mg IntraVENous Q24H     PRN Meds: hydrALAZINE, sodium chloride flush, sodium chloride, ondansetron **OR** ondansetron, acetaminophen **OR** acetaminophen, polyethylene glycol, guaiFENesin-dextromethorphan     Labs:  Personally reviewed and interpreted for clinical significance.     Recent Labs     04/01/24  0816 04/02/24  0616 04/03/24  0751   WBC 8.8 6.3 6.3   HGB 12.4 12.0 12.5   HCT 37.2 36.3 36.7    225 267     Recent Labs     04/01/24  0816 04/02/24  0616 04/03/24  0751   * 136 134*   K 3.6 3.5 4.0   CL 99 105 102   CO2 18* 20* 22   BUN 17 13 8   CREATININE 0.7 0.6 <0.5*   CALCIUM 9.0 8.8 9.1   MG  --  1.70*  --      No results for input(s): \"PROBNP\", \"TROPHS\" in the last 72 hours.  No results for input(s): \"LABA1C\" in the last 72 hours.  Recent Labs     03/31/24  1800   AST 24   ALT 10   BILITOT 1.0   ALKPHOS 83     No results for input(s): \"INR\", \"LACTA\", \"TSH\" in the last 72 hours.    Urine Cultures:   Lab Results   Component Value Date/Time    LABURIN  03/31/2024 07:38 PM     75,000 CFU/ml  Susceptibility testing not routinely done. No further work up.       Blood Cultures: No results found for: \"BC\"  No results found for: \"BLOODCULT2\"  Organism:   Lab Results   Component Value Date/Time    ORG Aerococcus species 03/31/2024 07:38 PM         Checo Calabrese MD

## 2024-04-03 NOTE — PROGRESS NOTES
Patient seen on bed, alert and oriented. IV on right and left forearm. Noted leak from IV line on right forearm, IV removed. Side rails up x 3. Bed alarm on. Call light within reach. Instructed to call for assistance. Will continue to monitor.

## 2024-04-03 NOTE — PROGRESS NOTES
Occupational Therapy  Facility/Department: [facility] C3 TELE/MED SURG/ONC  Daily Treatment Note  NAME: Ros Min  : 1935  MRN: [id number]    Date of Service: 4/3/2024    Discharge Recommendations:   (return to ECF/[facility])  OT Equipment Recommendations  Equipment Needed: No      Patient Diagnosis(es): The primary encounter diagnosis was Acute cystitis without hematuria. Diagnoses of COVID, Unsteady gait, and Encephalopathy were also pertinent to this visit.     Assessment    Assessment: Pt participated in OT session this date. Pt able to complete bed mobility and functional transfers with SBA (intermittent CGA). Pt completed standing oral hygiene at sink without LOB. Pt tolerance UE exer with good tolerance. Pt would continue to benefit from acute OT at this time to improve functional mobility and ADL independence. D/c recs for return to ECF when medically ready.      Activity Tolerance: Patient tolerated treatment well;Patient limited by endurance  Discharge Recommendations:  (return to ECF/[facility])  Equipment Needed: No      Plan   Occupational Therapy Plan  Times Per Week: 3-5x/week  Current Treatment Recommendations: Strengthening;ROM;Neuromuscular re-education;Patient/Caregiver education & training;Self-Care / ADL;Functional mobility training;Safety education & training;Equipment evaluation, education, & procurement     Restrictions  Restrictions/Precautions  Restrictions/Precautions: Up as Tolerated;Isolation;Fall Risk  Required Braces or Orthoses?: No  Position Activity Restriction  Other position/activity restrictions: Droplet Plus, IV    Subjective   Subjective  Subjective: Pt in bed upon OT arrival, pleasant and agreeable to therapy  Pain: denied pain at rest  Orientation  Overall Orientation Status: Impaired  Orientation Level: Oriented to person;Oriented to place;Disoriented to time;Oriented to situation  Cognition  Overall Cognitive Status: Exceptions  Arousal/Alertness: Appropriate responses to  stimuli  Following Commands: Follows one step commands consistently  Attention Span: Attends with cues to redirect  Memory: Decreased recall of recent events  Safety Judgement: Decreased awareness of need for assistance;Decreased awareness of need for safety  Problem Solving: Assistance required to generate solutions;Assistance required to identify errors made  Insights: Decreased awareness of deficits  Initiation: Requires cues for some  Sequencing: Requires cues for some        Objective    Vitals  Vitals  Pulse: 50  Heart Rate Source: Monitor  BP: (!) 154/72  BP Location: Left upper arm  BP Method: Automatic  Patient Position: Semi fowlers  MAP (Calculated): 99  SpO2: 97 %  O2 Device: None (Room air)  Bed Mobility Training  Bed Mobility Training: Yes  Supine to Sit: Stand-by assistance  Transfer Training  Transfer Training: Yes  Overall Level of Assistance: Contact-guard assistance  Interventions: Verbal cues;Safety awareness training  Sit to Stand: Contact-guard assistance  Stand to Sit: Contact-guard assistance  Bed to Chair: Stand-by assistance;Contact-guard assistance (pt with 1 LOB while up in bathroom, able to self corrent)  Toilet Transfer: Stand-by assistance;Additional time     ADL  Grooming: Stand by assistance  Grooming Skilled Clinical Factors: standing at sink to perform oral hygiene  Toileting: Stand by assistance  Toileting Skilled Clinical Factors: to void at toilet  Functional Mobility: Stand by assistance  Functional Mobility Skilled Clinical Factors: no AD  Additional Comments: declined further ADLs  OT Exercises  Exercise Treatment: Exercise  Pt completed  UE exer, 10 reps each arm:    [x] Hand grasp/release  [] Wrist flexion/extension  [x] Supination/pronation  [x] Elbow Flexion/extension  [] Chest Press  [] Shoulder Press  [x] Shoulder Flexion/Extension  [] Shoulder Abduction/Adduction     Safety Devices  Type of Devices: Chair alarm in place;Call light within reach;Left in chair;Gait

## 2024-04-03 NOTE — CARE COORDINATION
CM update; LOS # 3; Patient will return to AL at [facility], Recs present for PT/OT which will need to be coordinated with their therapy provider. Will follow.Elissa Boles RN

## 2024-04-04 VITALS
BODY MASS INDEX: 15.36 KG/M2 | TEMPERATURE: 97.5 F | RESPIRATION RATE: 16 BRPM | HEIGHT: 64 IN | DIASTOLIC BLOOD PRESSURE: 73 MMHG | HEART RATE: 51 BPM | WEIGHT: 90 LBS | SYSTOLIC BLOOD PRESSURE: 151 MMHG | OXYGEN SATURATION: 97 %

## 2024-04-04 PROCEDURE — 6370000000 HC RX 637 (ALT 250 FOR IP): Performed by: NURSE PRACTITIONER

## 2024-04-04 PROCEDURE — 97110 THERAPEUTIC EXERCISES: CPT

## 2024-04-04 PROCEDURE — 6360000002 HC RX W HCPCS: Performed by: NURSE PRACTITIONER

## 2024-04-04 PROCEDURE — 6370000000 HC RX 637 (ALT 250 FOR IP): Performed by: INTERNAL MEDICINE

## 2024-04-04 PROCEDURE — 6370000000 HC RX 637 (ALT 250 FOR IP): Performed by: STUDENT IN AN ORGANIZED HEALTH CARE EDUCATION/TRAINING PROGRAM

## 2024-04-04 PROCEDURE — 97530 THERAPEUTIC ACTIVITIES: CPT

## 2024-04-04 RX ORDER — AMOXICILLIN AND CLAVULANATE POTASSIUM 875; 125 MG/1; MG/1
1 TABLET, FILM COATED ORAL EVERY 12 HOURS SCHEDULED
Qty: 9 TABLET | Refills: 0 | Status: SHIPPED | OUTPATIENT
Start: 2024-04-04 | End: 2024-04-09

## 2024-04-04 RX ORDER — AMOXICILLIN AND CLAVULANATE POTASSIUM 875; 125 MG/1; MG/1
1 TABLET, FILM COATED ORAL EVERY 12 HOURS SCHEDULED
Status: DISCONTINUED | OUTPATIENT
Start: 2024-04-04 | End: 2024-04-04 | Stop reason: HOSPADM

## 2024-04-04 RX ORDER — AMLODIPINE BESYLATE 5 MG/1
5 TABLET ORAL DAILY
Qty: 30 TABLET | Refills: 3 | Status: SHIPPED | OUTPATIENT
Start: 2024-04-05

## 2024-04-04 RX ORDER — LOSARTAN POTASSIUM 100 MG/1
100 TABLET ORAL DAILY
Status: DISCONTINUED | OUTPATIENT
Start: 2024-04-04 | End: 2024-04-04 | Stop reason: HOSPADM

## 2024-04-04 RX ORDER — HYDROCHLOROTHIAZIDE 12.5 MG/1
12.5 CAPSULE, GELATIN COATED ORAL DAILY
Status: DISCONTINUED | OUTPATIENT
Start: 2024-04-04 | End: 2024-04-04 | Stop reason: HOSPADM

## 2024-04-04 RX ADMIN — AMLODIPINE BESYLATE 5 MG: 5 TABLET ORAL at 08:56

## 2024-04-04 RX ADMIN — ATENOLOL 50 MG: 25 TABLET ORAL at 09:00

## 2024-04-04 RX ADMIN — ENOXAPARIN SODIUM 30 MG: 100 INJECTION SUBCUTANEOUS at 08:55

## 2024-04-04 RX ADMIN — AMOXICILLIN AND CLAVULANATE POTASSIUM 1 TABLET: 875; 125 TABLET, FILM COATED ORAL at 08:55

## 2024-04-04 RX ADMIN — LOSARTAN POTASSIUM 100 MG: 100 TABLET, FILM COATED ORAL at 08:56

## 2024-04-04 RX ADMIN — ASPIRIN 81 MG: 81 TABLET, COATED ORAL at 08:56

## 2024-04-04 RX ADMIN — HYDROCHLOROTHIAZIDE 12.5 MG: 12.5 CAPSULE ORAL at 08:55

## 2024-04-04 NOTE — PLAN OF CARE
Problem: Pain  Goal: Verbalizes/displays adequate comfort level or baseline comfort level  Outcome: Adequate for Discharge     Problem: Skin/Tissue Integrity  Goal: Absence of new skin breakdown  Description: 1.  Monitor for areas of redness and/or skin breakdown  2.  Assess vascular access sites hourly  3.  Every 4-6 hours minimum:  Change oxygen saturation probe site  4.  Every 4-6 hours:  If on nasal continuous positive airway pressure, respiratory therapy assess nares and determine need for appliance change or resting period.  Outcome: Adequate for Discharge     Problem: ABCDS Injury Assessment  Goal: Absence of physical injury  Outcome: Adequate for Discharge     Problem: Safety - Adult  Goal: Free from fall injury  4/4/2024 1244 by Kathya Newell, RN  Outcome: Adequate for Discharge  4/4/2024 1241 by Kathya Newell, RN  Outcome: Progressing  Flowsheets (Taken 4/2/2024 0752 by Zee Mcclain, RN)  Free From Fall Injury:   Instruct family/caregiver on patient safety   Based on caregiver fall risk screen, instruct family/caregiver to ask for assistance with transferring infant if caregiver noted to have fall risk factors     Problem: Respiratory - Adult  Goal: Achieves optimal ventilation and oxygenation  Outcome: Adequate for Discharge

## 2024-04-04 NOTE — PLAN OF CARE
Problem: Safety - Adult  Goal: Free from fall injury  Outcome: Progressing  Free From Fall Injury:   Instruct family/caregiver on patient safety   Based on caregiver fall risk screen, instruct family/caregiver to ask for assistance with transferring infant if caregiver noted to have fall risk factors

## 2024-04-04 NOTE — CARE COORDINATION
CASE MANAGEMENT DISCHARGE SUMMARY      Discharge to: Back to [facility]    Precertification completed: N/A  Hospital Exemption Notification (HENS) completed: N/A    IMM given: (date) 4/4/24    New Durable Medical Equipment ordered/agency: Rolling walker    Transportation:    Family/car: Staff at [facility] [place]/ private auto   Pick-up [id number]    Confirmed discharge plan with: Patient returning to [facility]. Medications provided by [facility]to[facility]beds     Patient: yes     Family:  yes    Name: Will Contact number: [phone]     Facility/Agency, name:  [facility] Will accompany patient in packet   Phone number for report to facility: [phone]     RN, name: Kathya    Note: Discharging nurse to complete [facility], reconcile [facility], and place final copy with patient's discharge packet. RN to ensure that written prescriptions for  Level II medications are sent with patient to the facility as per protocol.  Elissa Boles RN

## 2024-04-04 NOTE — PROGRESS NOTES
Occupational Therapy  Facility/Department: [facility] C3 TELE/MED SURG/ONC  Daily Treatment Note  NAME: Ros Min  : 1935  MRN: [id number]  Date of Service: 2024    Discharge Recommendations:   (return to ECF)  OT Equipment Recommendations  Equipment Needed: Yes  Mobility Devices: Walker  Walker: Rolling    AM-PAC score  AM-PAC Inpatient Daily Activity Raw Score: 20 (24)  AM-PAC Inpatient ADL T-Scale Score : 42.03 (24)  ADL Inpatient CMS 0-100% Score: 38.32 (24)  ADL Inpatient CMS G-Code Modifier : CJ (24)    If pt is unable to be seen after this session, please let this note serve as discharge summary.  Please see case management note for discharge disposition.  Thank you.    Patient Diagnosis(es): The primary encounter diagnosis was Acute cystitis without hematuria. Diagnoses of COVID, Unsteady gait, and Encephalopathy were also pertinent to this visit.     Assessment    Assessment: Pt received for OT session resting at bedside eating breakfast to address current functional deficits that inhibit independence and safety with ADLs and functional mobility. Pt agreeable to session, reporting no pain. During session, pt completed sit<>stands w/ CGA, transfers w/ HHA to chair, eating w/ SPV, and BUE/BLE exercises w/ verbal cues. Pt reports needing RW to feel safer with mobility and transfers, pt this date requiring CGA/HHA for mobility and would benefit from RW at home. Pt will continue to benefit from skilled OT while in acute setting to increase functional activity tolerance, safety/independence w/ ADLs, and increase strength. Pt making good progress towards personalized OT goals. Continued recs for ECF. Continue POC.     Activity Tolerance: Patient tolerated treatment well;Patient limited by endurance  Discharge Recommendations:  (return to ECF)  Equipment Needed: Yes  Mobility Devices: Walker      Plan   Occupational Therapy Plan  Times Per Week:

## 2024-04-04 NOTE — PROGRESS NOTES
Assessment completed and documented. VSS. A/ox4 but confused at times. Denies pain. Pt up to chair for meals. Pt ambulates with walker in room. Bed locked and in lowest position. Bedside table and call light within reach. Denies further needs at this time.

## 2024-04-04 NOTE — PROGRESS NOTES
Discharge education provided both verbally and written, patient verbalized understanding, denies questions. Tele monitor removed, CM[facility] notified. PIV removed without complications. [facility] aware of discharge. Patient left with all belongings Including new medication. Pt left in the care of Ed from the [place]

## 2024-04-05 NOTE — DISCHARGE SUMMARY
V2.0  Discharge Summary    Name:  Ros Min /Age/Sex: 1935 (88 y.o. female)   Admit Date: 3/31/2024  Discharge Date: 24    MRN & CSN:  [id number] & [id number] Encounter Date and Time 24 3:08 PM EDT    Attending:  No att. providers found Discharging Provider: Reji Catalan MD       Hospital Course:     Brief HPI: Ros Min is a 88 y.o. female  with a significant past medical history of possible dementia, prior breast cancer, hypertension, hyperlipidemia, and trigeminal neuralgia who presents to [facility] ED via EMS from [facility] due to staff concerns for worsening confusion and also had a low-level from a seated position while on the commode last night. ED staff had attempted to verify baseline mentation, ECF staff could not verify, but son on phone stated only intermittent confusion at times. Patient was AAO x 3 in ED. She notes she was attempting to get up off the commode, but did not have the leg strength to stand up fully from the toilet, and fell down to the floor. She denies any injury, noting it was a low-impact fall. She notes she was unable to get herself up though after attempting to get herself up twice. She ended up falling asleep on the floor because she was unable to alert the staff.  She states that she slept overnight on the floor, was found in the morning when staff found to check on her.  She notes that there is no pain secondary to the fall, no head injury, no neck pain. She does admit to having urinary frequency over the last 48 hours.  Additionally, F staff checked a COVID test yesterday due to her having coughing, COVID test was positive.  Patient did not have a cough while in the ED per ED staff.  Urinalysis checked, was found to have pyuria and bacteriuria concerning for acute cystitis.  Other labs reviewed and pertinent for sodium 131, chloride 95, normal renal function, normal LFT, mild leukocytosis at 13.2, and normal hemoglobin     Brief Problem Based  03/31/2024 07:16 PM    UROBILINOGEN 0.2 03/31/2024 07:16 PM    BILIRUBINUR Negative 03/31/2024 07:16 PM    BLOODU TRACE-INTACT 03/31/2024 07:16 PM    GLUCOSEU Negative 03/31/2024 07:16 PM    KETUA TRACE 03/31/2024 07:16 PM    AMORPHOUS 3+ 09/04/2022 03:04 PM     Urine Cultures:   Lab Results   Component Value Date/Time    LABURIN  03/31/2024 07:38 PM     75,000 CFU/ml  Susceptibility testing not routinely done. No further work up.       Blood Cultures: No results found for: \"BC\"  No results found for: \"BLOODCULT2\"  Organism:   Lab Results   Component Value Date/Time    ORG Aerococcus species 03/31/2024 07:38 PM       Comment: Please note this report has been produced using speech recognition software and may contain errors related to that system including errors in grammar, punctuation, and spelling, as well as words and phrases that may be inappropriate. If there are any questions or concerns please feel free to contact the dictating provider for clarification.     Time Spent Discharging patient 35 minutes    Electronically signed by Reji Catalan MD on 4/5/2024 at 3:08 PM

## 2024-04-09 ENCOUNTER — TELEPHONE (OUTPATIENT)
Dept: CASE MANAGEMENT | Age: 89
End: 2024-04-09

## 2024-04-09 NOTE — TELEPHONE ENCOUNTER
Imaging report Chest XR 3/31/24 with f/u imaging recommendations sent to Catia ELIZALDE(R)  Patient Navigator  Incidentals/Lung Navigation  [email or web address]

## (undated) DEVICE — CONMED SCOPE SAVER BITE BLOCK, 20X27 MM: Brand: SCOPE SAVER

## (undated) DEVICE — SOLUTION IV 1000ML LAC RINGERS PH 6.5 INJ USP VIAFLX PLAS

## (undated) DEVICE — SYRINGE LL 10CC

## (undated) DEVICE — GLOVE,SURG,SENSICARE SLT,LF,PF,7: Brand: MEDLINE

## (undated) DEVICE — ELECTRODE PT RET AD L9FT HI MOIST COND ADH HYDRGEL CORDED

## (undated) DEVICE — COVER,TABLE,44X90,STERILE: Brand: MEDLINE

## (undated) DEVICE — BOWL 32OZ-LF: Brand: MEDLINE INDUSTRIES, INC.

## (undated) DEVICE — SOLUTION IV IRRIG WATER 1000ML POUR BRL 2F7114

## (undated) DEVICE — SYRINGE MED 50ML LUERLOCK TIP

## (undated) DEVICE — LAPAROSCOPIC CHOLANGIOGRAM CATHETER: Brand: AMERICAN CATHETER CORP

## (undated) DEVICE — CATHETER ADAPTER: Brand: ADDTO

## (undated) DEVICE — STANDARD HYPODERMIC NEEDLE,POLYPROPYLENE HUB: Brand: MONOJECT

## (undated) DEVICE — PUMP SUC IRR TBNG L10FT W/ HNDPC ASSEMB STRYKEFLOW 2

## (undated) DEVICE — GAUZE,SPONGE,2"X2",8PLY,STERILE,LF,2'S: Brand: MEDLINE

## (undated) DEVICE — SPONGE ENDOSPNG ST TO CLN FLX ENDOSCOPES 69396 ENDOSPNG

## (undated) DEVICE — SINGLE USE 3-LUMEN EXTRACTION BALLOON V: Brand: SINGLE USE 3-LUMEN EXTRACTION BALLOON V

## (undated) DEVICE — HYBRID TUBING/CAP SET FOR OLYMPUS® SCOPES: Brand: ERBE

## (undated) DEVICE — MEDIA CONTRAST OPTIRAY 320 50ML VIAL

## (undated) DEVICE — Device

## (undated) DEVICE — SOLUTION IV IRRIG POUR BRL 0.9% SODIUM CHL 2F7124